# Patient Record
Sex: FEMALE | Race: BLACK OR AFRICAN AMERICAN | Employment: OTHER | URBAN - METROPOLITAN AREA
[De-identification: names, ages, dates, MRNs, and addresses within clinical notes are randomized per-mention and may not be internally consistent; named-entity substitution may affect disease eponyms.]

---

## 2017-01-31 ENCOUNTER — HOSPITAL ENCOUNTER (OUTPATIENT)
Dept: MAMMOGRAPHY | Age: 72
Discharge: HOME | End: 2017-01-31
Attending: INTERNAL MEDICINE
Payer: MEDICARE

## 2017-01-31 DIAGNOSIS — Z12.31 VISIT FOR SCREENING MAMMOGRAM: ICD-10-CM

## 2017-01-31 PROCEDURE — 77063 BREAST TOMOSYNTHESIS BI: CPT

## 2018-02-05 ENCOUNTER — HOSPITAL ENCOUNTER (OUTPATIENT)
Dept: MAMMOGRAPHY | Age: 73
Discharge: HOME | End: 2018-02-05
Attending: INTERNAL MEDICINE
Payer: MEDICARE

## 2018-02-05 DIAGNOSIS — Z12.31 ENCOUNTER FOR SCREENING MAMMOGRAM FOR BREAST CANCER: ICD-10-CM

## 2018-02-05 PROCEDURE — 77067 SCR MAMMO BI INCL CAD: CPT

## 2019-04-19 ENCOUNTER — HOSPITAL ENCOUNTER (OUTPATIENT)
Dept: MAMMOGRAPHY | Age: 74
Discharge: HOME | End: 2019-04-19
Attending: INTERNAL MEDICINE
Payer: MEDICARE

## 2019-04-19 DIAGNOSIS — Z12.31 VISIT FOR SCREENING MAMMOGRAM: ICD-10-CM

## 2019-04-19 PROCEDURE — 77063 BREAST TOMOSYNTHESIS BI: CPT

## 2021-02-12 ENCOUNTER — VIRTUAL VISIT (OUTPATIENT)
Dept: FAMILY MEDICINE CLINIC | Age: 76
End: 2021-02-12
Payer: MEDICARE

## 2021-02-12 ENCOUNTER — TELEPHONE (OUTPATIENT)
Dept: FAMILY MEDICINE CLINIC | Age: 76
End: 2021-02-12

## 2021-02-12 DIAGNOSIS — Z13.820 SCREENING FOR OSTEOPOROSIS: ICD-10-CM

## 2021-02-12 DIAGNOSIS — N17.9 AKI (ACUTE KIDNEY INJURY) (HCC): ICD-10-CM

## 2021-02-12 DIAGNOSIS — Z13.6 SCREENING FOR CARDIOVASCULAR CONDITION: ICD-10-CM

## 2021-02-12 DIAGNOSIS — Z13.0 SCREENING FOR ENDOCRINE, METABOLIC AND IMMUNITY DISORDER: ICD-10-CM

## 2021-02-12 DIAGNOSIS — G56.03 BILATERAL CARPAL TUNNEL SYNDROME: ICD-10-CM

## 2021-02-12 DIAGNOSIS — Z13.29 SCREENING FOR ENDOCRINE, METABOLIC AND IMMUNITY DISORDER: ICD-10-CM

## 2021-02-12 DIAGNOSIS — M81.8 OTHER OSTEOPOROSIS WITHOUT CURRENT PATHOLOGICAL FRACTURE: ICD-10-CM

## 2021-02-12 DIAGNOSIS — I10 ESSENTIAL HYPERTENSION: ICD-10-CM

## 2021-02-12 DIAGNOSIS — Z13.228 SCREENING FOR ENDOCRINE, METABOLIC AND IMMUNITY DISORDER: ICD-10-CM

## 2021-02-12 DIAGNOSIS — Z12.11 SCREENING FOR COLON CANCER: ICD-10-CM

## 2021-02-12 PROCEDURE — G8427 DOCREV CUR MEDS BY ELIG CLIN: HCPCS | Performed by: NURSE PRACTITIONER

## 2021-02-12 PROCEDURE — 1101F PT FALLS ASSESS-DOCD LE1/YR: CPT | Performed by: NURSE PRACTITIONER

## 2021-02-12 PROCEDURE — G8432 DEP SCR NOT DOC, RNG: HCPCS | Performed by: NURSE PRACTITIONER

## 2021-02-12 PROCEDURE — G8756 NO BP MEASURE DOC: HCPCS | Performed by: NURSE PRACTITIONER

## 2021-02-12 PROCEDURE — 1090F PRES/ABSN URINE INCON ASSESS: CPT | Performed by: NURSE PRACTITIONER

## 2021-02-12 PROCEDURE — 3017F COLORECTAL CA SCREEN DOC REV: CPT | Performed by: NURSE PRACTITIONER

## 2021-02-12 PROCEDURE — 99203 OFFICE O/P NEW LOW 30 MIN: CPT | Performed by: NURSE PRACTITIONER

## 2021-02-12 RX ORDER — ASCORBIC ACID 500 MG
500 TABLET ORAL
COMMUNITY
Start: 2021-02-01 | End: 2021-02-12 | Stop reason: ALTCHOICE

## 2021-02-12 RX ORDER — ALBUTEROL SULFATE 90 UG/1
AEROSOL, METERED RESPIRATORY (INHALATION)
COMMUNITY
Start: 2020-11-18

## 2021-02-12 RX ORDER — ATENOLOL 25 MG/1
TABLET ORAL
COMMUNITY
Start: 2020-11-18 | End: 2021-03-19 | Stop reason: SINTOL

## 2021-02-12 RX ORDER — FAMOTIDINE 20 MG/1
20 TABLET, FILM COATED ORAL DAILY
COMMUNITY
Start: 2021-02-02 | End: 2021-02-12 | Stop reason: ALTCHOICE

## 2021-02-12 RX ORDER — MELATONIN
5000 DAILY
COMMUNITY
Start: 2021-02-02 | End: 2021-02-12 | Stop reason: ALTCHOICE

## 2021-02-12 RX ORDER — HYDROCHLOROTHIAZIDE 25 MG/1
25 TABLET ORAL DAILY
COMMUNITY
End: 2021-02-12 | Stop reason: SDUPTHER

## 2021-02-12 RX ORDER — DEXAMETHASONE 6 MG/1
TABLET ORAL
COMMUNITY
Start: 2021-02-02 | End: 2021-02-12 | Stop reason: ALTCHOICE

## 2021-02-12 RX ORDER — AMMONIUM LACTATE 12 G/100G
LOTION TOPICAL
COMMUNITY
Start: 2020-11-18

## 2021-02-12 RX ORDER — LANOLIN ALCOHOL/MO/W.PET/CERES
9 CREAM (GRAM) TOPICAL
COMMUNITY
Start: 2021-02-01 | End: 2021-02-12 | Stop reason: ALTCHOICE

## 2021-02-12 RX ORDER — AMLODIPINE BESYLATE 2.5 MG/1
TABLET ORAL
COMMUNITY
Start: 2020-12-30 | End: 2021-07-06 | Stop reason: SDUPTHER

## 2021-02-12 RX ORDER — HYDROCHLOROTHIAZIDE 12.5 MG/1
TABLET ORAL
COMMUNITY
Start: 2020-12-19 | End: 2021-03-19 | Stop reason: SDUPTHER

## 2021-02-12 NOTE — PROGRESS NOTES
Coral Lakhani is a 76 y.o. female who was seen by synchronous (real-time) audio-video technology on 2/12/2021 for Establish Care and Follow Up Chronic Condition (blood pressure)        Assessment & Plan:   Diagnoses and all orders for this visit:    1. Essential hypertension  -     REFERRAL TO CARDIOLOGY  -     METABOLIC PANEL, COMPREHENSIVE; Future    2. Bilateral carpal tunnel syndrome  -     REFERRAL TO ORTHOPEDICS    3. COLUMBA (acute kidney injury) ([facility] [address]  -     REFERRAL TO NEPHROLOGY  -     METABOLIC PANEL, COMPREHENSIVE; Future    4. Screening for colon cancer  -     COLOGUARD TEST (FECAL DNA COLORECTAL CANCER SCREENING)    5. Screening for osteoporosis  -     DEXA BONE DENSITY STUDY AXIAL; Future  -     METABOLIC PANEL, COMPREHENSIVE; Future    6. Screening for endocrine, metabolic and immunity disorder  -     HEPATITIS C AB; Future  -     METABOLIC PANEL, COMPREHENSIVE; Future    7. Screening for cardiovascular condition  -     LIPID PANEL; Future    8. Other osteoporosis without current pathological fracture   -     DEXA BONE DENSITY STUDY AXIAL; Future        I spent at least 30 minutes on this visit with this new patient. 712  Subjective:   Patient is being seen today to establish care as a new patient. Patient is also presenting for a hospital follow-up. She was admitted into the hospital on January 27, 2021 and discharged on February 6, 2021. She was admitted for acute kidney injury at that time she also had pneumonia of both lower lobes and patient also was positive for Covid. During the hospital stay she also had severe sepsis. According to records patient have altered mental status. Patient is a Jehovah witness. According to notes she is a poor historian and cannot provide much detail. Patient previous PCP was Dr. Xiomara Sarah. Patient had a EKG completed, a CT of the head with no contrast, chest x-ray that showed atelectatic changes.     Hypertension  Patient is currently on Amlodpine, Atenolol, Colonidine, and HCTZ. Patient states her blood pressure varies from 130/70 to 190/90. Patient denies having chest pain, SOB, vision changes or headaches. Patient states she is concerned that she is on these medication and continue to have fluctuations. She is requested to be referred to a cardiologist for a full workup. Dermatitis   Placed on ammonium lactate 12% for dry skin. This prescribed by previous PCP    COLUMBA  Patient was diagnosed in the ER. She is unaware of this diagnosis and states she just had a UTI. Patient denies any frequency or urgency. Caparl tunnel   Patient states she have  carpal tunnel and was advised to give a splint. Patient states she have numbness and tingling in her fingers on both hands. She states her hands sometimes throb as well and her wrist hurt upon movement sometimes. She have been having these symptoms for 6 months. Patient states she takes tylenol but it mildly helps. She states she wears a brace on both wrist at night to help with relief of pain. Reviewed medications with patient today. Prior to Admission medications    Medication Sig Start Date End Date Taking? Authorizing Provider   atenoloL (TENORMIN) 25 mg tablet TAKE 1 TABLET BY MOUTH EVERY DAY 11/18/20  Yes Provider, Historical   albuterol (PROVENTIL HFA, VENTOLIN HFA, PROAIR HFA) 90 mcg/actuation inhaler 1 OR 2 PUFFS EVERY 4 HRS AS NEEDED 11/18/20  Yes Provider, Historical   amLODIPine (NORVASC) 2.5 mg tablet TAKE 1 TABLET BY MOUTH EVERY DAY 12/30/20  Yes Provider, Historical   famotidine (PEPCID) 20 mg tablet Take 20 mg by mouth daily.  2/2/21  Yes Provider, Historical   ammonium lactate (LAC-HYDRIN) 12 % lotion DIME  SIZED AMOUTN TWICE A DAY TO LEGS 90 DAYS 11/18/20  Yes Provider, Historical   hydroCHLOROthiazide (HYDRODIURIL) 12.5 mg tablet TAKE 1 TABLET BY MOUTH ONCE A DAY FOR BLOOD PRESSURE 90 12/19/20  Yes Provider, Historical   cholecalciferol (VITAMIN D3) 1,000 unit tablet Take 1 Tab by mouth daily. 8/6/15  Yes Gene Edgar MD   calcium-vitamin D (CALCIUM 500+D) 500 mg(1,250mg) -200 unit per tablet Take 1 Tab by mouth two (2) times daily (with meals). 8/6/15  Yes Gene Edgar MD   cloNIDine HCl (CATAPRES) 0.2 mg tablet Take 0.2 mg by mouth two (2) times a day. Yes Provider, Historical   hydroCHLOROthiazide (HYDRODIURIL) 25 mg tablet Take 25 mg by mouth daily. 2/12/21  Provider, Historical   HYDROcodone-acetaminophen (NORCO) 5-325 mg per tablet Take 1 Tab by mouth every four (4) hours as needed. Max Daily Amount: 6 Tabs. 8/6/15   Gene Edgar MD   triamterene-hydrochlorothiazide (MAXZIDE) 37.5-25 mg per tablet Take 1 Tab by mouth daily. 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    Diverticulitis K57.92    Hematuria R31.9    GI bleeding K92.2     Patient Active Problem List    Diagnosis Date Noted    GI bleeding 08/04/2015    Hypertension     Positive PPD     Diverticulitis     Hematuria      No Known Allergies  Past Surgical History:   Procedure Laterality Date    ENDOSCOPY, COLON, DIAGNOSTIC  01/29/2008    HX GYN      Fibroid Tumorectomy    HX KNEE ARTHROSCOPY  5/26/10    R knee     Social History     Tobacco Use    Smoking status: Never Smoker    Smokeless tobacco: Never Used   Substance Use Topics    Alcohol use: No       Review of Systems   Constitutional: Negative. Negative for chills, fever and malaise/fatigue. Eyes: Negative. Respiratory: Negative for cough, shortness of breath and wheezing. Cardiovascular: Negative for chest pain, palpitations and leg swelling. Musculoskeletal:        Wrist pain with numbness and tingling   Skin: Negative. Neurological: Negative.         Objective:     Patient-Reported Vitals 2/12/2021   Patient-Reported Weight 210   Patient-Reported Systolic  306   Patient-Reported Diastolic 70      General: alert, cooperative, no distress   Mental status: normal mood, behavior, speech, dress, motor activity, and thought processes, able to follow commands   HENT: NCAT   Neck: no visualized mass   Resp: no respiratory distress   Neuro: no gross deficits   Skin: no discoloration or lesions of concern on visible areas   Psychiatric: normal affect, consistent with stated mood, no evidence of hallucinations     Additional exam findings: We discussed the expected course, resolution and complications of the diagnosis(es) in detail. Medication risks, benefits, costs, interactions, and alternatives were discussed as indicated. I advised her to contact the office if her condition worsens, changes or fails to improve as anticipated. She expressed understanding with the diagnosis(es) and plan. Sammie Power, who was evaluated through a patient-initiated, synchronous (real-time) audio-video encounter, and/or her healthcare decision maker, is aware that it is a billable service, with coverage as determined by her insurance carrier. She provided verbal consent to proceed: Yes, and patient identification was verified. It was conducted pursuant to the emergency declaration under the [address] [place] [address], [address] [place] [address] authority and the [employer] Act. A caregiver was present when appropriate. Ability to conduct physical exam was limited. I was in the office. The patient was at home.       Belem Benavidez NP

## 2021-02-12 NOTE — TELEPHONE ENCOUNTER
Called to schedule:    Return in about 4 weeks (around 3/12/2021) for davon lab appt. then 4 week follow up for hypertension, poss diabetes    Left message to call back.

## 2021-02-18 ENCOUNTER — HOSPITAL ENCOUNTER (OUTPATIENT)
Dept: BONE DENSITY | Age: 76
Discharge: HOME | End: 2021-02-18
Payer: MEDICARE

## 2021-02-18 DIAGNOSIS — Z13.820 SCREENING FOR OSTEOPOROSIS: ICD-10-CM

## 2021-02-18 DIAGNOSIS — M81.8 OTHER OSTEOPOROSIS WITHOUT CURRENT PATHOLOGICAL FRACTURE: ICD-10-CM

## 2021-02-18 PROCEDURE — 77080 DXA BONE DENSITY AXIAL: CPT

## 2021-02-19 ENCOUNTER — OFFICE VISIT (OUTPATIENT)
Dept: ORTHOPEDIC SURGERY | Age: 76
End: 2021-02-19
Payer: MEDICARE

## 2021-02-19 VITALS
RESPIRATION RATE: 16 BRPM | SYSTOLIC BLOOD PRESSURE: 149 MMHG | OXYGEN SATURATION: 100 % | BODY MASS INDEX: 41.35 KG/M2 | HEART RATE: 101 BPM | HEIGHT: 61 IN | TEMPERATURE: 97.2 F | WEIGHT: 219 LBS | DIASTOLIC BLOOD PRESSURE: 89 MMHG

## 2021-02-19 DIAGNOSIS — G56.03 BILATERAL CARPAL TUNNEL SYNDROME: Primary | ICD-10-CM

## 2021-02-19 PROCEDURE — G8536 NO DOC ELDER MAL SCRN: HCPCS | Performed by: ORTHOPAEDIC SURGERY

## 2021-02-19 PROCEDURE — 99203 OFFICE O/P NEW LOW 30 MIN: CPT | Performed by: ORTHOPAEDIC SURGERY

## 2021-02-19 PROCEDURE — G8754 DIAS BP LESS 90: HCPCS | Performed by: ORTHOPAEDIC SURGERY

## 2021-02-19 PROCEDURE — G8432 DEP SCR NOT DOC, RNG: HCPCS | Performed by: ORTHOPAEDIC SURGERY

## 2021-02-19 PROCEDURE — 1101F PT FALLS ASSESS-DOCD LE1/YR: CPT | Performed by: ORTHOPAEDIC SURGERY

## 2021-02-19 PROCEDURE — G8399 PT W/DXA RESULTS DOCUMENT: HCPCS | Performed by: ORTHOPAEDIC SURGERY

## 2021-02-19 PROCEDURE — 1090F PRES/ABSN URINE INCON ASSESS: CPT | Performed by: ORTHOPAEDIC SURGERY

## 2021-02-19 PROCEDURE — G8417 CALC BMI ABV UP PARAM F/U: HCPCS | Performed by: ORTHOPAEDIC SURGERY

## 2021-02-19 PROCEDURE — 3017F COLORECTAL CA SCREEN DOC REV: CPT | Performed by: ORTHOPAEDIC SURGERY

## 2021-02-19 PROCEDURE — G8753 SYS BP > OR = 140: HCPCS | Performed by: ORTHOPAEDIC SURGERY

## 2021-02-19 PROCEDURE — 20526 THER INJECTION CARP TUNNEL: CPT | Performed by: ORTHOPAEDIC SURGERY

## 2021-02-19 PROCEDURE — G8427 DOCREV CUR MEDS BY ELIG CLIN: HCPCS | Performed by: ORTHOPAEDIC SURGERY

## 2021-02-19 NOTE — PROGRESS NOTES
Lucie Thomas is a 76 y.o. female right handed retiree. Worker's Compensation and legal considerations: none filed. Vitals:    02/19/21 1358   BP: (!) 149/89   Pulse: (!) 101   Resp: 16   Temp: 97.2 °F (36.2 °C)   SpO2: 100%   Weight: 219 lb (99.3 kg)   Height: 5' 0.5\" (1.537 m)   PainSc:   8           Chief Complaint   Patient presents with    Wrist Pain     bilateral         HPI: Patient presents today with complaints of bilateral hand numbness and tingling.     Date of onset: January 2021    Injury: No    Prior Treatment:  No    Numbness/ Tingling: Yes: Comment: Bilateral      ROS: Review of Systems - General ROS: negative  Psychological ROS: negative  ENT ROS: negative  Allergy and Immunology ROS: negative  Hematological and Lymphatic ROS: negative  Respiratory ROS: no cough, shortness of breath, or wheezing  Cardiovascular ROS: no chest pain or dyspnea on exertion  Gastrointestinal ROS: no abdominal pain, change in bowel habits, or black or bloody stools  Musculoskeletal ROS: positive for - pain in hand - bilateral  Neurological ROS: positive for - numbness/tingling  Dermatological ROS: negative    Past Medical History:   Diagnosis Date    COVID-19     Diverticulitis     Hematuria     Hypertension     Positive PPD     Sinusitis     Vertigo        Past Surgical History:   Procedure Laterality Date    ENDOSCOPY, COLON, DIAGNOSTIC  01/29/2008    HX GYN      Fibroid Tumorectomy    HX KNEE ARTHROSCOPY  5/26/10    R knee       Current Outpatient Medications   Medication Sig Dispense Refill    albuterol (PROVENTIL HFA, VENTOLIN HFA, PROAIR HFA) 90 mcg/actuation inhaler 1 OR 2 PUFFS EVERY 4 HRS AS NEEDED      amLODIPine (NORVASC) 2.5 mg tablet TAKE 1 TABLET BY MOUTH EVERY DAY      ammonium lactate (LAC-HYDRIN) 12 % lotion DIME  SIZED AMOUTN TWICE A DAY TO LEGS 90 DAYS      hydroCHLOROthiazide (HYDRODIURIL) 12.5 mg tablet TAKE 1 TABLET BY MOUTH ONCE A DAY FOR BLOOD PRESSURE 90      cloNIDine HCl (CATAPRES) 0.2 mg tablet Take 0.2 mg by mouth two (2) times a day.  atenoloL (TENORMIN) 25 mg tablet TAKE 1 TABLET BY MOUTH EVERY DAY         No Known Allergies        PE:     Physical Exam  Vitals signs and nursing note reviewed. Constitutional:       General: She is not in acute distress. Appearance: Normal appearance. She is not ill-appearing. Eyes:      Extraocular Movements: Extraocular movements intact. Pupils: Pupils are equal, round, and reactive to light. Neck:      Musculoskeletal: Normal range of motion and neck supple. Cardiovascular:      Pulses: Normal pulses. Pulmonary:      Effort: Pulmonary effort is normal. No respiratory distress. Abdominal:      General: Abdomen is flat. There is no distension. Musculoskeletal: Normal range of motion. General: No swelling, tenderness, deformity or signs of injury. Right lower leg: No edema. Left lower leg: No edema. Skin:     General: Skin is warm and dry. Capillary Refill: Capillary refill takes less than 2 seconds. Findings: No bruising or erythema. Neurological:      General: No focal deficit present. Mental Status: She is alert and oriented to person, place, and time. Cranial Nerves: No cranial nerve deficit. Sensory: No sensory deficit. Psychiatric:         Mood and Affect: Mood normal.         Behavior: Behavior normal.            NEUROVASCULAR    Examination L R Examination L R   Carpal Comp. + + Pronator Comp. - -   Carpal Tinel + + Pronator Tinel - -   Phalen's + + Pronator Stress - -   Cubital Comp. - - Guyon Comp. - -   Cubital Tinel - - Guyon Tinel - -   Elbow Hyperflexion - - Adson's - -   Spurling's - - SC Comp. - -   PCB Median abn - - SC Tinel - -   Radial Tinel - - IC Comp.  - -   Digital Tinel - - IC Tinel - -   Radial 2-Pt WNL WNL Ulnar 2-Pt WNL WNL     Radial Pulse: 2+  Capillary Refill: < 2 sec  Clifford: Not Performed  Digital Clifford: Not Performed        Imaging: None indicated        ICD-10-CM ICD-9-CM    1. Bilateral carpal tunnel syndrome  G56.03 354.0 AMB SUPPLY ORDER      triamcinolone acetonide (KENALOG) 10 mg/mL injection 10 mg      INJECT CARPAL TUNNEL      EMG TWO EXTREMITIES UPPER      NCV/LAT MOTOR PER NERVE UP/RT      NCV/LAT MOTOR PER NERVE UP/LT         Plan:     Bilateral carpal tunnel injections, carpal tunnel braces, and upper extremity EMGs. Follow-up and Dispositions    · Return for EMG review. Plan was reviewed with patient, who verbalized agreement and understanding of the plan    [address] [place] [address] NOTE        Chart reviewed for the following:   Chava RAMIREZ DO, have reviewed the History, Physical and updated the Allergic reactions for [address] [place] [address] Rd performed immediately prior to start of procedure:   Chava RAMIREZ DO, have performed the following reviews on [facility] prior to the start of the procedure:            * Patient was identified by name and date of birth   * Agreement on procedure being performed was verified  * Risks and Benefits explained to the patient  * Procedure site verified and marked as necessary  * Patient was positioned for comfort  * Consent was signed and verified     Time: 14:15      Date of procedure: 2/19/2021    Procedure performed by: Gurdeep Mullen DO    Provider assisted by: Juan Mcduffie LPN    Patient assisted by: self    How tolerated by patient: tolerated the procedure well with no complications    Post Procedural Pain Scale: 0 - No Hurt    Comments: none    Procedure:  After consent was obtained, using sterile technique the carpal tunnel was prepped. Local anesthetic used: 1% lidocaine. Kenalog 5 mg X2 and was then injected and the needle withdrawn. The procedure was well tolerated. The patient is asked to continue to rest the area for a few more days before resuming regular activities.   It may be more painful for the first 1-2 days. Watch for fever, or increased swelling or persistent pain in the joint. Call or return to clinic prn if such symptoms occur or there is failure to improve as anticipated.

## 2021-02-26 DIAGNOSIS — G56.03 BILATERAL CARPAL TUNNEL SYNDROME: ICD-10-CM

## 2021-03-01 ENCOUNTER — OFFICE VISIT (OUTPATIENT)
Dept: ORTHOPEDIC SURGERY | Age: 76
End: 2021-03-01
Payer: MEDICARE

## 2021-03-01 VITALS
WEIGHT: 216 LBS | DIASTOLIC BLOOD PRESSURE: 83 MMHG | HEART RATE: 85 BPM | TEMPERATURE: 97.4 F | HEIGHT: 63 IN | BODY MASS INDEX: 38.27 KG/M2 | SYSTOLIC BLOOD PRESSURE: 145 MMHG | RESPIRATION RATE: 20 BRPM

## 2021-03-01 DIAGNOSIS — M54.12 CERVICAL RADICULOPATHY: ICD-10-CM

## 2021-03-01 DIAGNOSIS — R94.131 ABNORMAL EMG: ICD-10-CM

## 2021-03-01 DIAGNOSIS — R20.0 NUMBNESS AND TINGLING IN BOTH HANDS: Primary | ICD-10-CM

## 2021-03-01 DIAGNOSIS — R20.2 NUMBNESS AND TINGLING IN BOTH HANDS: Primary | ICD-10-CM

## 2021-03-01 DIAGNOSIS — R20.0 NUMBNESS AND TINGLING IN BOTH HANDS: ICD-10-CM

## 2021-03-01 DIAGNOSIS — R20.2 NUMBNESS AND TINGLING IN BOTH HANDS: ICD-10-CM

## 2021-03-01 DIAGNOSIS — G54.9 MYELORADICULOPATHY: ICD-10-CM

## 2021-03-01 PROCEDURE — G8432 DEP SCR NOT DOC, RNG: HCPCS | Performed by: PHYSICAL MEDICINE & REHABILITATION

## 2021-03-01 PROCEDURE — 1090F PRES/ABSN URINE INCON ASSESS: CPT | Performed by: PHYSICAL MEDICINE & REHABILITATION

## 2021-03-01 PROCEDURE — G8536 NO DOC ELDER MAL SCRN: HCPCS | Performed by: PHYSICAL MEDICINE & REHABILITATION

## 2021-03-01 PROCEDURE — G8399 PT W/DXA RESULTS DOCUMENT: HCPCS | Performed by: PHYSICAL MEDICINE & REHABILITATION

## 2021-03-01 PROCEDURE — 99204 OFFICE O/P NEW MOD 45 MIN: CPT | Performed by: PHYSICAL MEDICINE & REHABILITATION

## 2021-03-01 PROCEDURE — 95912 NRV CNDJ TEST 11-12 STUDIES: CPT | Performed by: PHYSICAL MEDICINE & REHABILITATION

## 2021-03-01 PROCEDURE — 3017F COLORECTAL CA SCREEN DOC REV: CPT | Performed by: PHYSICAL MEDICINE & REHABILITATION

## 2021-03-01 PROCEDURE — 95886 MUSC TEST DONE W/N TEST COMP: CPT | Performed by: PHYSICAL MEDICINE & REHABILITATION

## 2021-03-01 PROCEDURE — G8754 DIAS BP LESS 90: HCPCS | Performed by: PHYSICAL MEDICINE & REHABILITATION

## 2021-03-01 PROCEDURE — G8427 DOCREV CUR MEDS BY ELIG CLIN: HCPCS | Performed by: PHYSICAL MEDICINE & REHABILITATION

## 2021-03-01 PROCEDURE — G8753 SYS BP > OR = 140: HCPCS | Performed by: PHYSICAL MEDICINE & REHABILITATION

## 2021-03-01 PROCEDURE — G8417 CALC BMI ABV UP PARAM F/U: HCPCS | Performed by: PHYSICAL MEDICINE & REHABILITATION

## 2021-03-01 PROCEDURE — 1101F PT FALLS ASSESS-DOCD LE1/YR: CPT | Performed by: PHYSICAL MEDICINE & REHABILITATION

## 2021-03-01 RX ORDER — PREDNISONE 10 MG/1
TABLET ORAL
Qty: 21 TAB | Refills: 0 | Status: SHIPPED | OUTPATIENT
Start: 2021-03-01 | End: 2021-03-30

## 2021-03-01 NOTE — PROGRESS NOTES
[address]  [address], [address]  [address]  Phone: [phone]  Fax: [phone]        Juan Antonio Hendricks  : 1945  PCP: Trace De Los Santos NP  3/1/2021    NEW PATIENT AND   ELECTROMYOGRAPHY AND NERVE CONDUCTION STUDIES     Sissy Libman was referred by Dr. Eleonora Scott for electrodiagnostic evaluation of bilateral hand numbness and tingling. NCV & EMG Findings:  Evaluation of the right median/radial (dig I) comparison nerve showed prolonged distal peak latency (Median, 3.0 ms) and abnormal peak latency difference (Median-Radial, 0.6 ms). The right median/ulnar (dig IV) comparison nerve showed prolonged distal peak latency (Median Wr, 3.8 ms) and abnormal peak latency difference (Median Wr-Ulnar Wr, 0.6 ms). All remaining nerves (as indicated in the following tables) were within normal limits. Left vs. Right side comparison data for the median sensory nerve indicates abnormal L-R latency difference (0.5 ms). All remaining left vs. right side differences were within normal limits. Needle evaluation of the left biceps muscle showed increased motor unit amplitude. The left deltoid muscle showed increased insertional activity, slightly increased spontaneous activity, and slightly increased polyphasic potentials. All remaining muscles (as indicated in the following table) showed no evidence of electrical instability. INTERPRETATION  This is an abnormal electrodiagnostic examination. These findings may be consistent with:   1. Mild median mononeuropathy at the right wrist (carpal tunnel syndrome)   2. Chronic left C5 radiculopathy - this is based on signs of muscle membrane instability and chronicity in the left deltoid and biceps. CLINICAL INTERPRETATION  Her electrodiagnostic finding of right carpal tunnel syndrome is consistent with some of her right hand symptoms.  However, her left arm, which is the worse side appears to be showing some signs of radiculopathy. This may be consistent with her exam finding of bilateral positive green's. PLAN  1. Cervical MRI - numbness and tingling in both hands; abnormal EMG; positive green's sign bilaterally; falls; evaluate myeloradiculopathy. 2. Prednisone dosepack    Pt will f/u after MRI or sooner as needed. HISTORY OF PRESENT ILLNESS  Eitan Vyas is a 76 y.o. female. Pt presents today for BUE EMG evaluation of bilateral hand numbness and tingling. She is LHD. Pt notes that she was previously told by another physician in the past that she had a pinched nerve in her neck, but she never had imaging or treatment. Pt notes that she has had falls recently. PAST MEDICAL HISTORY   Past Medical History:   Diagnosis Date    COVID-19     Diverticulitis     Hematuria     Hypertension     Positive PPD     Sinusitis     Vertigo        Past Surgical History:   Procedure Laterality Date    ENDOSCOPY, COLON, DIAGNOSTIC  01/29/2008    HX GYN      Fibroid Tumorectomy    HX KNEE ARTHROSCOPY  5/26/10    R knee   . MEDICATIONS    Current Outpatient Medications   Medication Sig Dispense Refill    atenoloL (TENORMIN) 25 mg tablet TAKE 1 TABLET BY MOUTH EVERY DAY      albuterol (PROVENTIL HFA, VENTOLIN HFA, PROAIR HFA) 90 mcg/actuation inhaler 1 OR 2 PUFFS EVERY 4 HRS AS NEEDED      amLODIPine (NORVASC) 2.5 mg tablet TAKE 1 TABLET BY MOUTH EVERY DAY      ammonium lactate (LAC-HYDRIN) 12 % lotion DIME  SIZED AMOUTN TWICE A DAY TO LEGS 90 DAYS      hydroCHLOROthiazide (HYDRODIURIL) 12.5 mg tablet TAKE 1 TABLET BY MOUTH ONCE A DAY FOR BLOOD PRESSURE 90      cloNIDine HCl (CATAPRES) 0.2 mg tablet Take 0.2 mg by mouth two (2) times a day.           ALLERGIES  No Known Allergies       SOCIAL HISTORY    Social History     Socioeconomic History    Marital status: SINGLE     Spouse name: Not on file    Number of children: Not on file    Years of education: Not on file    Highest education level: Not on file   Tobacco Use    Smoking status: Never Smoker    Smokeless tobacco: Never Used   Substance and Sexual Activity    Alcohol use: No    Drug use: Never       FAMILY HISTORY  Family History   Problem Relation Age of Onset    Cancer Mother     Hypertension Father     Stroke Father     Cancer Sister          PHYSICAL EXAMINATION  Visit Vitals  BP (!) 145/83   Pulse 85   Temp 97.4 °F (36.3 °C) (Temporal)   Resp 20   Ht 5' 3\" (1.6 m)   Wt 216 lb (98 kg)   BMI 38.26 kg/m²       Pain Assessment  3/1/2021   Location of Pain Hand;Wrist   Location Modifiers Left;Right   Severity of Pain 0   Quality of Pain -   Quality of Pain Comment -   Duration of Pain Persistent   Frequency of Pain (No Data)   Frequency of Pain Comment n/t   Limiting Behavior -   Result of Injury -           Constitutional:  Well developed, well nourished, in no acute distress. Psychiatric: Affect and mood are appropriate. Integumentary: No rashes or abrasions noted on exposed areas. SPINE/MUSCULOSKELETAL EXAM    On brief examination:     Cervical spine:  Neck is midline. Normal muscle tone. No focal atrophy is noted. ROM pain free. Shoulder ROM intact. No tenderness to palpation. Negative Spurling's sign. Negative Tinel's sign. Positive Waters's sign bilaterally. Negative Vaz sign on the L. Sensation in the bilateral arms grossly intact to light touch. Strength intact. No clonus at feet. DTRs are +2.     NCV & EMG Findings:  Nerve Conduction Studies  Anti Sensory Summary Table     Stim Site NR Peak (ms) Norm Peak (ms) O-P Amp (µV) Norm O-P Amp Site1 Site2 Delta-P (ms) Dist (cm) Alexander (m/s) Norm Alexander (m/s)   Left Median Anti Sensory (2nd Digit)   Wrist    3.1 <3.6 24.0 >10 Wrist 2nd Digit 3.1 14.0 45 >39   Right Median Anti Sensory (2nd Digit)   Wrist    3.6 <3.6 19.9 >10 Wrist 2nd Digit 3.6 14.0 39 >39   Left Radial Anti Sensory (Base 1st Digit)   Wrist    1.9 <3.1 38.4 Wrist Base 1st Digit 1.9 0.0     Right Radial Anti Sensory (Base 1st Digit)   Wrist    2.1 <3.1 25.5  Wrist Base 1st Digit 2.1 0.0     Left Ulnar Anti Sensory (5th Digit)   Wrist    3.2 <3.7 24.5 >15.0 Wrist 5th Digit 3.2 14.0 44 >38   Right Ulnar Anti Sensory (5th Digit)   Wrist    3.3 <3.7 32.0 >15.0 Wrist 5th Digit 3.3 14.0 42 >38     Motor Summary Table     Stim Site NR Onset (ms) Norm Onset (ms) O-P Amp (mV) Norm O-P Amp Site1 Site2 Delta-0 (ms) Dist (cm) Alexander (m/s) Norm Alexander (m/s)   Left Median Motor (Abd Poll Brev)   Wrist    3.4 <4.2 7.2 >5 Elbow Wrist 3.4 20.5 60 >50   Elbow    6.8  6.7          Right Median Motor (Abd Poll Brev)   Wrist    3.6 <4.2 8.7 >5 Elbow Wrist 4.1 24.0 59 >50   Elbow    7.7  8.1          Left Ulnar Motor (Abd Dig Min)   Wrist    2.6 <4.2 10.1 >3 B Elbow Wrist 3.3 19.0 58 >53   B Elbow    5.9  9.1  A Elbow B Elbow 1.9 10.0 53 >53   A Elbow    7.8  8.8          Right Ulnar Motor (Abd Dig Min)   Wrist    3.0 <4.2 10.9 >3 B Elbow Wrist 3.1 18.0 58 >53   B Elbow    6.1  9.1  A Elbow B Elbow 1.9 10.0 53 >53   A Elbow    8.0  8.0            Comparison Summary Table     Stim Site NR Peak (ms) Norm Peak (ms) O-P Amp (µV) Site1 Site2 Delta-P (ms) Norm Delta (ms)   Right Median/Radial Dig I Comparison (Digit 1 - 10cm)   Median    3.0 <2.9 21.9 Median Radial 0.6 <0.4   Radial    2.4 <2.8 9.8       Right Median/Ulnar Dig IV Comparison (Digit 4 - 14cm)   Median Wr    3.8 <3.3 82.2 Median Wr Ulnar Wr 0.6 <0.4   Ulnar Wr    3.2 <3.3 5.2         EMG     Side Muscle Nerve Root Ins Act Fibs Psw Amp Dur Poly Recrt Int Lorette Rout Comment   Right Biceps Musculocut C5-6 Nml Nml Nml Nml Nml 0 Nml Nml    Right Triceps Radial C6-7-8 Nml Nml Nml Nml Nml 0 Nml Nml    Right PronatorTeres Median C6-7 Nml Nml Nml Nml Nml 0 Nml Nml    Right Abd Poll Brev Median C8-T1 Nml Nml Nml Nml Nml 0 Nml Nml    Right 1stDorInt Ulnar C8-T1 Nml Nml Nml Nml Nml 0 Nml Nml    Left Biceps Musculocut C5-6 Nml Nml Nml Incr Nml 0 Nml Nml   Left Triceps Radial C6-7-8 Nml Nml Nml Nml Nml 0 Nml Nml    Left PronatorTeres Median C6-7 Nml Nml Nml Nml Nml 0 Nml Nml    Left Abd Poll Brev Median C8-T1 Nml Nml Nml Nml Nml 0 Nml Nml    Left 1stDorInt Ulnar C8-T1 Nml Nml Nml Nml Nml 0 Nml Nml    Left Deltoid Axillary C5-6 Incr 1+ 1+ Nml Nml 1+ Nml Nml    Left Cervical Parasp Up Rami C1-3 Nml Nml Nml         Left Cervical Parasp Mid Rami C4-6 Nml Nml Nml         Left Cervical Parasp Low Rami C7-8 Nml Nml Nml             Nerve Conduction Studies  Anti Sensory Left/Right Comparison     Stim Site L Lat (ms) R Lat (ms) L-R Lat (ms) L Amp (µV) R Amp (µV) L-R Amp (%) Site1 Site2 L Alexander (m/s) R Alexander (m/s) L-R Alexander (m/s)   Median Anti Sensory (2nd Digit)   Wrist 3.1 3.6 0.5 24.0 19.9 17.1 Wrist 2nd Digit 45 39 6   Radial Anti Sensory (Base 1st Digit)   Wrist 1.9 2.1 0.2 38.4 25.5 33.6 Wrist Base 1st Digit      Ulnar Anti Sensory (5th Digit)   Wrist 3.2 3.3 0.1 24.5 32.0 23.4 Wrist 5th Digit 44 42 2     Motor Left/Right Comparison     Stim Site L Lat (ms) R Lat (ms) L-R Lat (ms) L Amp (mV) R Amp (mV) L-R Amp (%) Site1 Site2 L Alexander (m/s) R Alexander (m/s) L-R Alexander (m/s)   Median Motor (Abd Poll Brev)   Wrist 3.4 3.6 0.2 7.2 8.7 17.2 Elbow Wrist 60 59 1   Elbow 6.8 7.7 0.9 6.7 8.1 17.3        Ulnar Motor (Abd Dig Min)   Wrist 2.6 3.0 0.4 10.1 10.9 7.3 B Elbow Wrist 58 58 0   B Elbow 5.9 6.1 0.2 9.1 9.1 0.0 A Elbow B Elbow 53 53 0   A Elbow 7.8 8.0 0.2 8.8 8.0 9.1          Comparison Left/Right Comparison     Stim Site L Lat (ms) R Lat (ms) L-R Lat (ms) L Amp (µV) R Amp (µV) L-R Amp (%)   Median/Radial Dig I Comparison (Digit 1 - 10cm)   Median  3.0   21.9    Radial  2.4   9.8    Median/Ulnar Dig IV Comparison (Digit 4 - 14cm)   Median Wr  3.8   82.2    Ulnar Wr  3.2   5.2          Waveforms:                                    [facility]  OFFICE PROCEDURE PROGRESS NOTE        Chart reviewed for the following:   I, Ruddy Yeh, have reviewed the  History, Physical and updated the Allergic reactions for Yoselin Maw     TIME OUT performed immediately prior to start of procedure:   I, Rasheed Landon, have performed the following reviews on Yoselin Maw prior to the start of the procedure:            * Patient was identified by name and date of birth   * Agreement on procedure being performed was verified  * Risks and Benefits explained to the patient  * Procedure site verified and marked as necessary  * Patient was positioned for comfort  * Consent was signed and verified     Time: 10:58 AM    Date of procedure: 3/1/2021    Procedure performed by:  Myra Sheppard MD    Provider accompanied by: Shaye. Patient accompanied by: Self.     How tolerated by patient: tolerated the procedure well with no complications    Post Procedural Pain Scale: 2 - Hurts Little Bit    Comments: none    Written by Tarah Buckner, [address] as dictated by Rasheed Landon MD

## 2021-03-05 ENCOUNTER — TELEPHONE (OUTPATIENT)
Dept: CARDIOLOGY CLINIC | Age: 76
End: 2021-03-05

## 2021-03-05 NOTE — TELEPHONE ENCOUNTER
Per Dr Sydnie Davalos, Shakila Rousseau NP to be seen for HTN. Called and was unable to reach patient for her appointment I have mailed patient the appointment . She is scheduled to see Dr Johnny Geronimo on 3/19/21 @ 11:15 a.m.

## 2021-03-08 DIAGNOSIS — R20.2 NUMBNESS AND TINGLING IN BOTH HANDS: ICD-10-CM

## 2021-03-08 DIAGNOSIS — R94.131 ABNORMAL EMG: ICD-10-CM

## 2021-03-08 DIAGNOSIS — R20.0 NUMBNESS AND TINGLING IN BOTH HANDS: ICD-10-CM

## 2021-03-08 DIAGNOSIS — G54.9 MYELORADICULOPATHY: ICD-10-CM

## 2021-03-08 DIAGNOSIS — M54.12 CERVICAL RADICULOPATHY: ICD-10-CM

## 2021-03-17 DIAGNOSIS — M85.80 OSTEOPENIA, UNSPECIFIED LOCATION: Primary | ICD-10-CM

## 2021-03-17 RX ORDER — CALCIUM CARBONATE/VITAMIN D3 500 MG-10
2 TABLET ORAL
Qty: 60 TAB | Refills: 11 | Status: SHIPPED | OUTPATIENT
Start: 2021-03-17 | End: 2021-03-19 | Stop reason: ALTCHOICE

## 2021-03-17 NOTE — PROGRESS NOTES
Please notify patient of her DEXA scan results showing osteopenia. Inform patient this is the midpoint between having healthy bones and having osteoporosis. Osteopenia is when your bones are weaker than normal but not so far gone that they break easily. I have prescribed patient a calcium and vitamin D supplement to start taking. It has been sent to her pharmacy.

## 2021-03-19 ENCOUNTER — OFFICE VISIT (OUTPATIENT)
Dept: CARDIOLOGY CLINIC | Age: 76
End: 2021-03-19
Payer: MEDICARE

## 2021-03-19 VITALS
HEIGHT: 63 IN | TEMPERATURE: 97.6 F | BODY MASS INDEX: 38.8 KG/M2 | DIASTOLIC BLOOD PRESSURE: 87 MMHG | HEART RATE: 72 BPM | WEIGHT: 219 LBS | SYSTOLIC BLOOD PRESSURE: 157 MMHG

## 2021-03-19 DIAGNOSIS — I50.9 ACUTE CONGESTIVE HEART FAILURE, UNSPECIFIED HEART FAILURE TYPE (HCC): Primary | ICD-10-CM

## 2021-03-19 DIAGNOSIS — R00.2 PALPITATIONS: ICD-10-CM

## 2021-03-19 DIAGNOSIS — E66.01 SEVERE OBESITY (BMI 35.0-39.9) WITH COMORBIDITY (HCC): ICD-10-CM

## 2021-03-19 DIAGNOSIS — R55 SYNCOPE, UNSPECIFIED SYNCOPE TYPE: ICD-10-CM

## 2021-03-19 DIAGNOSIS — I10 ESSENTIAL HYPERTENSION: ICD-10-CM

## 2021-03-19 PROCEDURE — G8427 DOCREV CUR MEDS BY ELIG CLIN: HCPCS | Performed by: INTERNAL MEDICINE

## 2021-03-19 PROCEDURE — G8417 CALC BMI ABV UP PARAM F/U: HCPCS | Performed by: INTERNAL MEDICINE

## 2021-03-19 PROCEDURE — G8754 DIAS BP LESS 90: HCPCS | Performed by: INTERNAL MEDICINE

## 2021-03-19 PROCEDURE — 1090F PRES/ABSN URINE INCON ASSESS: CPT | Performed by: INTERNAL MEDICINE

## 2021-03-19 PROCEDURE — G8536 NO DOC ELDER MAL SCRN: HCPCS | Performed by: INTERNAL MEDICINE

## 2021-03-19 PROCEDURE — 99205 OFFICE O/P NEW HI 60 MIN: CPT | Performed by: INTERNAL MEDICINE

## 2021-03-19 PROCEDURE — G8432 DEP SCR NOT DOC, RNG: HCPCS | Performed by: INTERNAL MEDICINE

## 2021-03-19 PROCEDURE — 3017F COLORECTAL CA SCREEN DOC REV: CPT | Performed by: INTERNAL MEDICINE

## 2021-03-19 PROCEDURE — G8399 PT W/DXA RESULTS DOCUMENT: HCPCS | Performed by: INTERNAL MEDICINE

## 2021-03-19 PROCEDURE — G8753 SYS BP > OR = 140: HCPCS | Performed by: INTERNAL MEDICINE

## 2021-03-19 PROCEDURE — 1101F PT FALLS ASSESS-DOCD LE1/YR: CPT | Performed by: INTERNAL MEDICINE

## 2021-03-19 RX ORDER — HYDROCHLOROTHIAZIDE 25 MG/1
25 TABLET ORAL DAILY
Qty: 30 TAB | Refills: 1 | Status: SHIPPED | OUTPATIENT
Start: 2021-03-19 | End: 2021-04-08 | Stop reason: SDDI

## 2021-03-19 RX ORDER — ASPIRIN 81 MG/1
81 TABLET ORAL DAILY
Qty: 100 TAB | Status: SHIPPED | OUTPATIENT
Start: 2021-03-19 | End: 2021-05-10 | Stop reason: SINTOL

## 2021-03-19 NOTE — PATIENT INSTRUCTIONS
Medications Discontinued During This Encounter Medication Reason  Calcium-Cholecalciferol, D3, (Calcium 500 + D) 500 mg(1,250mg) -400 unit tab Therapy Completed  atenoloL (TENORMIN) 25 mg tablet Side Effects  hydroCHLOROthiazide (HYDRODIURIL) 12.5 mg tablet REORDER After the recommended changes have been made in blood pressure medicines, patient advised to keep BP/HR(pulse rate) chart twice daily and bring us results in next 4 to 5 days. Patient may send the results via \"My Chart\" if desired. Please rest for 5-10 minutes before checking blood pressure. Sit on a comfortable chair without crossing the legs and put your arm on a table. We recommend that you use an upper arm cuff. Check the blood pressure 3 times each time you check the blood pressure and record the lowest reading. If you check the blood pressure in both arms, use the higher reading. Learning About the [address] [facility] Diet What is the Mediterranean diet? The Mediterranean diet is a style of eating rather than a diet plan. It features foods eaten in [place], [place], [place] and [facility], and other countries along the [facility]. It emphasizes eating foods like fish, fruits, vegetables, beans, high-fiber breads and whole grains, nuts, and olive oil. This style of eating includes limited red meat, cheese, and sweets. Why choose the Mediterranean diet? A Mediterranean-style diet may improve heart health. It contains more fat than other heart-healthy diets. But the fats are mainly from nuts, unsaturated oils (such as fish oils and olive oil), and certain nut or seed oils (such as canola, soybean, or flaxseed oil). These fats may help protect the heart and blood vessels. How can you get started on the Mediterranean diet? Here are some things you can do to switch to a more Mediterranean way of eating. What to eat · Eat a variety of fruits and vegetables each day, such as grapes, blueberries, tomatoes, broccoli, peppers, figs, olives, spinach, eggplant, beans, lentils, and chickpeas. · Eat a variety of whole-grain foods each day, such as oats, brown rice, and whole wheat bread, pasta, and couscous. · Eat fish at least 2 times a week. Try tuna, salmon, mackerel, lake trout, herring, or sardines. · Eat moderate amounts of low-fat dairy products, such as milk, cheese, or yogurt. · Eat moderate amounts of poultry and eggs. · Choose healthy (unsaturated) fats, such as nuts, olive oil, and certain nut or seed oils like canola, soybean, and flaxseed. · Limit unhealthy (saturated) fats, such as butter, palm oil, and coconut oil. And limit fats found in animal products, such as meat and dairy products made with whole milk. Try to eat red meat only a few times a month in very small amounts. · Limit sweets and desserts to only a few times a week. This includes sugar-sweetened drinks like soda. The Mediterranean diet may also include red wine with your meal1 glass each day for women and up to 2 glasses a day for men. Tips for eating at home · Use herbs, spices, garlic, lemon zest, and citrus juice instead of salt to add flavor to foods. · Add avocado slices to your sandwich instead of villaseñor. · Have fish for lunch or dinner instead of red meat. Brush the fish with olive oil, and broil or grill it. · Sprinkle your salad with seeds or nuts instead of cheese. · Cook with olive or canola oil instead of butter or oils that are high in saturated fat. · Switch from 2% milk or whole milk to 1% or fat-free milk. · Dip raw vegetables in a vinaigrette dressing or hummus instead of dips made from mayonnaise or sour cream. 
· Have a piece of fruit for dessert instead of a piece of cake. Try baked apples, or have some dried fruit. Tips for eating out · Try broiled, grilled, baked, or poached fish instead of having it fried or breaded. · Ask your  to have your meals prepared with olive oil instead of butter.  
· Order dishes made with marinara sauce or sauces made from olive oil. Avoid sauces made from cream or mayonnaise. · Choose whole-grain breads, whole wheat pasta and pizza crust, brown rice, beans, and lentils. · Cut back on butter or margarine on bread. Instead, you can dip your bread in a small amount of olive oil. · Ask for a side salad or grilled vegetables instead of french fries or chips. Where can you learn more? Go to http://www.[employer]/ Enter 846-936-0061 in the search box to learn more about \"Learning About the Mediterranean Diet. \" Current as of: August 22, 2019               Content Version: 12.6 © 1617-9832 [employer]. Care instructions adapted under license by [employer] (which disclaims liability or warranty for this information). If you have questions about a medical condition or this instruction, always ask your healthcare professional. [place] [address] any warranty or liability for your use of this information.

## 2021-03-19 NOTE — PROGRESS NOTES
HISTORY OF PRESENT ILLNESS  Rosa Elena Bowen is a 76 y.o. female. 3/19/2021 seen for hypertension but also has other symptoms as noted below. Episode of syncope a couple of weeks ago in the tub when she fell while taking a shower. She thinks she lost her consciousness completely. Denies any aura or dizziness prior to that. Did not have any palpitations prior to that. Upon waking up, she felt completely normal immediately. New Patient  The history is provided by the medical records and patient. Associated symptoms include shortness of breath. Pertinent negatives include no chest pain and no headaches. Hypertension  Associated symptoms include shortness of breath. Pertinent negatives include no chest pain and no headaches. Shortness of Breath  The history is provided by the patient. This is a new problem. The problem occurs intermittently. The current episode started more than 1 week ago. Associated symptoms include leg swelling. Pertinent negatives include no fever, no headaches, no cough, no wheezing, no PND, no orthopnea, no chest pain, no vomiting, no rash and no claudication. The problem's precipitants include exercise (house chores). Leg Swelling  The history is provided by the patient. This is a new problem. The current episode started more than 1 week ago (yrs). The problem occurs daily. Associated symptoms include shortness of breath. Pertinent negatives include no chest pain and no headaches. The symptoms are aggravated by standing. Palpitations   The history is provided by the patient. This is a new problem. The current episode started more than 1 week ago (2/20). The problem occurs daily. The problem is associated with nothing. Associated symptoms include lower extremity edema and shortness of breath. Pertinent negatives include no fever, no malaise/fatigue, no chest pain, no claudication, no orthopnea, no PND, no nausea, no vomiting, no headaches, no dizziness and no cough.  Her past medical history is significant for hypertension. No Known Allergies    Past Medical History:   Diagnosis Date    Asthma     COVID-19     Diverticulitis     Hematuria     Hypertension     Positive PPD     Sinusitis     Syncope     Vertigo        Family History   Problem Relation Age of Onset    Cancer Mother     Hypertension Father     Stroke Father     Cancer Sister        Social History     Tobacco Use    Smoking status: Never Smoker    Smokeless tobacco: Never Used   Substance Use Topics    Alcohol use: No    Drug use: Never        Current Outpatient Medications   Medication Sig    hydroCHLOROthiazide (HYDRODIURIL) 25 mg tablet Take 1 Tab by mouth daily.  aspirin delayed-release 81 mg tablet Take 1 Tab by mouth daily.  predniSONE (STERAPRED DS) 10 mg dose pack See administration instruction per 10mg dose pack    albuterol (PROVENTIL HFA, VENTOLIN HFA, PROAIR HFA) 90 mcg/actuation inhaler 1 OR 2 PUFFS EVERY 4 HRS AS NEEDED    amLODIPine (NORVASC) 2.5 mg tablet TAKE 1 TABLET BY MOUTH EVERY DAY    ammonium lactate (LAC-HYDRIN) 12 % lotion DIME  SIZED AMOUTN TWICE A DAY TO LEGS 90 DAYS    cloNIDine HCl (CATAPRES) 0.2 mg tablet Take 0.2 mg by mouth two (2) times a day. No current facility-administered medications for this visit. Past Surgical History:   Procedure Laterality Date    ENDOSCOPY, COLON, DIAGNOSTIC  01/29/2008    HX GYN      Fibroid Tumorectomy    HX KNEE ARTHROSCOPY  5/26/10    R knee       Visit Vitals  BP (!) 157/87   Pulse 72   Temp 97.6 °F (36.4 °C) (Temporal)   Ht 5' 3\" (1.6 m)   Wt 99.3 kg (219 lb)   BMI 38.79 kg/m²       Diagnostic Studies:  I have reviewed the relevant tests done on the patient and show as follows  EKG tracings reviewed by me today.     EKG Results     None        XR Results (most recent):  Results from Hospital Encounter encounter on 06/11/15   XR CHEST PA LAT    Narrative PA and lateral Chest    Indication:  Shortness of breath    Comparison:  May 14 2010    Findings: The cardiomediastinal silhouette is normal.  The lungs are clear showing no  masses, infiltrates or effusions. There is no vascular congestion. Thoracic  spondylosis is evident. Impression IMPRESSION:    No active disease       Ms. Ginger Solano has a reminder for a \"due or due soon\" health maintenance. I have asked that she contact her primary care provider for follow-up on this health maintenance. Review of Systems   Constitutional: Negative for chills, fever, malaise/fatigue and weight loss. HENT: Negative for nosebleeds. Eyes: Negative for discharge. Respiratory: Positive for shortness of breath. Negative for cough and wheezing. Cardiovascular: Positive for palpitations and leg swelling. Negative for chest pain, orthopnea, claudication and PND. Gastrointestinal: Negative for diarrhea, nausea and vomiting. Genitourinary: Negative for dysuria and hematuria. Musculoskeletal: Negative for joint pain. Skin: Negative for rash. Neurological: Negative for dizziness, seizures, loss of consciousness and headaches. Endo/Heme/Allergies: Negative for polydipsia. Does not bruise/bleed easily. Psychiatric/Behavioral: Negative for depression and substance abuse. The patient does not have insomnia. Physical Exam   Constitutional: She is oriented to person, place, and time. She appears well-developed and well-nourished. No distress. Sev obese   HENT:   Head: Normocephalic and atraumatic. Mouth/Throat: Normal dentition. Eyes: Right eye exhibits no discharge. Left eye exhibits no discharge. No scleral icterus. Neck: Neck supple. Hepatojugular reflux and JVD present. Carotid bruit is not present. No thyromegaly present. Cardiovascular: Normal rate, regular rhythm, S1 normal, S2 normal, normal heart sounds and intact distal pulses. Exam reveals no gallop and no friction rub. No murmur heard.   Pulmonary/Chest: Effort normal and breath sounds normal. She has no wheezes. She has no rales. Abdominal: Soft. She exhibits no mass. There is no abdominal tenderness. Musculoskeletal:         General: Edema (2-3+) present. Lymphadenopathy:        Right cervical: No superficial cervical adenopathy present. Left cervical: No superficial cervical adenopathy present. Neurological: She is alert and oriented to person, place, and time. Skin: Skin is warm and dry. No rash noted. Psychiatric: She has a normal mood and affect. Her behavior is normal.       ASSESSMENT and PLAN      Diagnoses and all orders for this visit:    1. Acute congestive heart failure, unspecified heart failure type (HCC)  -     hydroCHLOROthiazide (HYDRODIURIL) 25 mg tablet; Take 1 Tab by mouth daily.  -     ECHO ADULT COMPLETE; Future  -     METABOLIC PANEL, BASIC; Future  -     CBC W/O DIFF; Future  -     LIPID PANEL; Future  -     HEPATIC FUNCTION PANEL; Future  -     TSH 3RD GENERATION; Future  -     aspirin delayed-release 81 mg tablet; Take 1 Tab by mouth daily. 2. Syncope, unspecified syncope type  -     [place] EXTERNAL MOBILE CV TELEMETRY W/I&REPORT UP TO 30 DAYS  -     ECHO ADULT COMPLETE; Future  -     aspirin delayed-release 81 mg tablet; Take 1 Tab by mouth daily. 3. Essential hypertension  -     LIPID PANEL; Future    4. Palpitations  -     [place] EXTERNAL MOBILE CV TELEMETRY W/I&REPORT UP TO 30 DAYS    5. Severe obesity (BMI 35.0-39. 9) with comorbidity [facility])        Pertinent laboratory and test data reviewed and discussed with patient.   See patient instructions also for other medical advice given    Medications Discontinued During This Encounter   Medication Reason    Calcium-Cholecalciferol, D3, (Calcium 500 + D) 500 mg(1,250mg) -400 unit tab Therapy Completed    atenoloL (TENORMIN) 25 mg tablet Side Effects    hydroCHLOROthiazide (HYDRODIURIL) 12.5 mg tablet REORDER       Follow-up and Dispositions    · Return in about 6 weeks (around 4/30/2021), or if symptoms worsen or fail to improve, for post test.       3/19/2021   Has acute CHF of unclear etiology. Check echo. As CHF improves will need CAD work-up also  Had syncope and is undergoing neurology work-up but will get a mobile telemetry. Increase HCTZ for hypertension and CHF. Follow-up labs in 2 weeks also. She is trying to lose weight. Mediterranean diet guidelines printed.

## 2021-03-24 ENCOUNTER — HOSPITAL ENCOUNTER (OUTPATIENT)
Age: 76
Discharge: HOME | End: 2021-03-24
Attending: PHYSICAL MEDICINE & REHABILITATION
Payer: MEDICARE

## 2021-03-24 PROCEDURE — 72141 MRI NECK SPINE W/O DYE: CPT

## 2021-03-25 ENCOUNTER — HOSPITAL ENCOUNTER (OUTPATIENT)
Dept: NON INVASIVE DIAGNOSTICS | Age: 76
Discharge: HOME | End: 2021-03-25
Attending: INTERNAL MEDICINE
Payer: MEDICARE

## 2021-03-25 VITALS
WEIGHT: 219 LBS | DIASTOLIC BLOOD PRESSURE: 87 MMHG | HEIGHT: 63 IN | SYSTOLIC BLOOD PRESSURE: 157 MMHG | BODY MASS INDEX: 38.8 KG/M2

## 2021-03-25 DIAGNOSIS — I50.9 ACUTE CONGESTIVE HEART FAILURE, UNSPECIFIED HEART FAILURE TYPE (HCC): ICD-10-CM

## 2021-03-25 DIAGNOSIS — R55 SYNCOPE, UNSPECIFIED SYNCOPE TYPE: ICD-10-CM

## 2021-03-25 LAB
ECHO AO ROOT DIAM: 3.29 CM
ECHO LA AREA 4C: 19.06 CM2
ECHO LA VOL 2C: 30.76 ML (ref 22–52)
ECHO LA VOL 4C: 51.54 ML (ref 22–52)
ECHO LA VOL BP: 45.03 ML (ref 22–52)
ECHO LA VOL/BSA BIPLANE: 22.4 ML/M2 (ref 16–28)
ECHO LA VOLUME INDEX A2C: 15.3 ML/M2 (ref 16–28)
ECHO LA VOLUME INDEX A4C: 25.64 ML/M2 (ref 16–28)
ECHO LV E' LATERAL VELOCITY: 8.47 CM/S
ECHO LV E' SEPTAL VELOCITY: 5.14 CM/S
ECHO LV INTERNAL DIMENSION DIASTOLIC: 3.62 CM (ref 3.9–5.3)
ECHO LV INTERNAL DIMENSION SYSTOLIC: 2.02 CM
ECHO LV IVSD: 1.27 CM (ref 0.6–0.9)
ECHO LV MASS 2D: 132 G (ref 67–162)
ECHO LV MASS INDEX 2D: 65.7 G/M2 (ref 43–95)
ECHO LV POSTERIOR WALL DIASTOLIC: 1.01 CM (ref 0.6–0.9)
ECHO LVOT CARDIAC OUTPUT: 5.98 LITER/MINUTE
ECHO LVOT DIAM: 1.91 CM
ECHO LVOT PEAK GRADIENT: 5.3 MMHG
ECHO LVOT PEAK VELOCITY: 115.13 CM/S
ECHO LVOT SV: 69.7 ML
ECHO LVOT VTI: 24.24 CM
ECHO MV A VELOCITY: 83.79 CM/S
ECHO MV E DECELERATION TIME (DT): 260.47 MS
ECHO MV E VELOCITY: 59.7 CM/S
ECHO MV E/A RATIO: 0.71
ECHO MV E/E' LATERAL: 7.05
ECHO MV E/E' RATIO (AVERAGED): 9.33
ECHO MV E/E' SEPTAL: 11.61
ECHO RV TAPSE: 1.97 CM (ref 1.5–2)
ECHO TV REGURGITANT MAX VELOCITY: 233.28 CM/S
ECHO TV REGURGITANT PEAK GRADIENT: 21.77 MMHG
LVOT MG: 3.34 MMHG

## 2021-03-25 PROCEDURE — 93306 TTE W/DOPPLER COMPLETE: CPT | Performed by: INTERNAL MEDICINE

## 2021-03-25 PROCEDURE — 93306 TTE W/DOPPLER COMPLETE: CPT

## 2021-03-30 ENCOUNTER — OFFICE VISIT (OUTPATIENT)
Dept: ORTHOPEDIC SURGERY | Age: 76
End: 2021-03-30
Payer: MEDICARE

## 2021-03-30 VITALS
RESPIRATION RATE: 18 BRPM | SYSTOLIC BLOOD PRESSURE: 124 MMHG | HEIGHT: 63 IN | BODY MASS INDEX: 38.59 KG/M2 | DIASTOLIC BLOOD PRESSURE: 84 MMHG | HEART RATE: 89 BPM | TEMPERATURE: 97.8 F | OXYGEN SATURATION: 99 % | WEIGHT: 217.8 LBS

## 2021-03-30 DIAGNOSIS — M48.02 CERVICAL SPINAL STENOSIS: ICD-10-CM

## 2021-03-30 DIAGNOSIS — G95.9 CERVICAL MYELOPATHY (HCC): Primary | ICD-10-CM

## 2021-03-30 PROCEDURE — 3017F COLORECTAL CA SCREEN DOC REV: CPT | Performed by: PHYSICAL MEDICINE & REHABILITATION

## 2021-03-30 PROCEDURE — G8536 NO DOC ELDER MAL SCRN: HCPCS | Performed by: PHYSICAL MEDICINE & REHABILITATION

## 2021-03-30 PROCEDURE — G8754 DIAS BP LESS 90: HCPCS | Performed by: PHYSICAL MEDICINE & REHABILITATION

## 2021-03-30 PROCEDURE — 99213 OFFICE O/P EST LOW 20 MIN: CPT | Performed by: PHYSICAL MEDICINE & REHABILITATION

## 2021-03-30 PROCEDURE — G8417 CALC BMI ABV UP PARAM F/U: HCPCS | Performed by: PHYSICAL MEDICINE & REHABILITATION

## 2021-03-30 PROCEDURE — G8752 SYS BP LESS 140: HCPCS | Performed by: PHYSICAL MEDICINE & REHABILITATION

## 2021-03-30 PROCEDURE — G8399 PT W/DXA RESULTS DOCUMENT: HCPCS | Performed by: PHYSICAL MEDICINE & REHABILITATION

## 2021-03-30 PROCEDURE — G8427 DOCREV CUR MEDS BY ELIG CLIN: HCPCS | Performed by: PHYSICAL MEDICINE & REHABILITATION

## 2021-03-30 PROCEDURE — 1101F PT FALLS ASSESS-DOCD LE1/YR: CPT | Performed by: PHYSICAL MEDICINE & REHABILITATION

## 2021-03-30 PROCEDURE — 1090F PRES/ABSN URINE INCON ASSESS: CPT | Performed by: PHYSICAL MEDICINE & REHABILITATION

## 2021-03-30 PROCEDURE — G8510 SCR DEP NEG, NO PLAN REQD: HCPCS | Performed by: PHYSICAL MEDICINE & REHABILITATION

## 2021-03-30 NOTE — PATIENT INSTRUCTIONS
Cervical Spinal Stenosis: Care Instructions Your Care Instructions Spinal stenosis is a narrowing of the canal that surrounds the spinal cord and nerve roots. Sometimes bone and other tissue grow into this canal and press on the nerves that branch out from the spinal cord. This can happen as a part of aging. When the narrowing happens in your neck, it's called cervical spinal stenosis. It often causes stiffness, pain, numbness, and weakness in the neck, shoulders, arms, hands, or legs. It can even cause problems with your balance, coordination, and bowel or bladder control. But some people have no symptoms. You may be able to get relief from the symptoms of spinal stenosis by taking pain medicine. Your doctor may suggest physical therapy and exercises to keep your spine strong and flexible. Some people try steroid shots to reduce swelling. If pain and numbness in your neck, arms, or legs are still so bad that you cannot do your normal activities, you may need surgery. Follow-up care is a key part of your treatment and safety. Be sure to make and go to all appointments, and call your doctor if you are having problems. It's also a good idea to know your test results and keep a list of the medicines you take. How can you care for yourself at home? · Ask your doctor if you can take an over-the-counter pain medicine, such as acetaminophen (Tylenol), ibuprofen (Advil, Motrin), or naproxen (Aleve). Be safe with medicines. Read and follow all instructions on the label. · Do not take two or more pain medicines at the same time unless the doctor told you to. Many pain medicines have acetaminophen, which is Tylenol. Too much acetaminophen (Tylenol) can be harmful. · Change positions often when you are standing or sitting. This may reduce pressure on the spinal cord and its nerves. · When you rest, use pillows or towel rolls to support your neck and head in a comfortable position.  
· Follow your doctor's instructions about activity. He or she may tell you not to do sports or activities that could injure your neck. · Stretch your neck and shoulders as your doctor or physical therapist recommends. If your doctor says it is okay to do them, these exercises may help: ? Neck stretches to the side. Keep your shoulders relaxed and slowly tilt your head straight over toward one shoulder. Hold for 15 seconds. Let the weight of your head stretch your muscles. Then do the same toward the other shoulder. ? Neck rotations. Keep your chin level and slowly turn your head to one side. Hold for 15 seconds. Then do the same to the other side. ? Shoulder rolls. Roll your shoulders up, then back, and then down in a smooth, circular motion. Repeat several times. When should you call for help? Call 911 anytime you think you may need emergency care. For example, call if: 
  · You are unable to move an arm or a leg at all. Call your doctor now or seek immediate medical care if: 
  · You have new or worse symptoms in your arms, legs, belly, or buttocks. Symptoms may include: 
? Numbness or tingling. ? Weakness. ? Pain.  
  · You lose bladder or bowel control. Watch closely for changes in your health, and be sure to contact your doctor if: 
  · You do not get better as expected. Where can you learn more? Go to http://www.gray.com/ Enter  in the search box to learn more about \"Cervical Spinal Stenosis: Care Instructions. \" Current as of: March 2, 2020               Content Version: 12.6 © 2006-2020 [employer]. Care instructions adapted under license by [employer] (which disclaims liability or warranty for this information). If you have questions about a medical condition or this instruction, always ask your healthcare professional. [place] [address] any warranty or liability for your use of this information.

## 2021-03-30 NOTE — PROGRESS NOTES
[address], [address]  [place], [address] [place] [address] Street  Phone: [phone]  Fax: [phone]        Reza Luna  : 1945  PCP: Freda Montgomery NP  3/30/2021    PROGRESS NOTE      HISTORY OF PRESENT ILLNESS  Alix Hudson is a 76 y.o. female who was seen as a new patient 3/1/2021 for BUE EMG and c/o bilateral hand numbness and tingling. She is LHD. Pt notes that she was previously told by another physician in the past that she had a pinched nerve in her neck, but she never had imaging or treatment. Pt notes that she has had falls recently. A BUE EMG dated 3/1/21 was suggestive of  1. Mild median mononeuropathy at the right wrist (carpal tunnel syndrome) 2. Chronic left C5 radiculopathy - this is based on signs of muscle membrane instability and chronicity in the left deltoid and biceps. Alix Hudson comes in to the office today for f/u. Cervical spine MRI dated 3/24/21 reviewed. Per report, Moderate to severe multifactorial spinal canal stenosis at C2/C3 through C6/C7, on the basis of degenerative disc disease, uncovertebral arthritis, facet  arthritis, and ligamentum flavum buckling. Myelomalacia at C3/C4. Multilevel foraminal stenosis which is most significant moderate to severe at right C2/C3, right C3/C4, bilateral C5/C6 and bilateral C6/C7 levels. Mild multilevel Modic degenerative endplate changes, a potential pain generator. Pain Score: 4/10. PmHx: HTN    ASSESSMENT  This is a 76year-old female who was seen for a BUE EMG for evaluation of bilateral hand numbness and tingling. EMG results suggestive of right carpal tunnel syndrome and a chronic left C5 radiculopathy. Her cervical MRI showed moderate to severe multilevel cervical spinal stenosis with myelomalacia at C3/4. Based on these MRI and EMG findings, I would like to have her evaluated from a surgical standpoint given her falls and positive Waters's sign. PLAN  1. Referral to Dr. Isidra Reed for surgical consult. Pt will f/u with Dr. Isidra Reed for surgical consult. Diagnoses and all orders for this visit:    1. Cervical myelopathy (HCC)  -     REFERRAL TO SPINE SURGERY    2. Cervical spinal stenosis  -     REFERRAL TO SPINE SURGERY         PAST MEDICAL HISTORY   Past Medical History:   Diagnosis Date    Asthma     COVID-19     Diverticulitis     Hematuria     Hypertension     Positive PPD     Sinusitis     Syncope     Vertigo        Past Surgical History:   Procedure Laterality Date    ENDOSCOPY, COLON, DIAGNOSTIC  01/29/2008    HX GYN      Fibroid Tumorectomy    HX KNEE ARTHROSCOPY  5/26/10    R knee   . MEDICATIONS    Current Outpatient Medications   Medication Sig Dispense Refill    hydroCHLOROthiazide (HYDRODIURIL) 25 mg tablet Take 1 Tab by mouth daily. 30 Tab 1    aspirin delayed-release 81 mg tablet Take 1 Tab by mouth daily. 100 Tab prn    predniSONE (STERAPRED DS) 10 mg dose pack See administration instruction per 10mg dose pack 21 Tab 0    albuterol (PROVENTIL HFA, VENTOLIN HFA, PROAIR HFA) 90 mcg/actuation inhaler 1 OR 2 PUFFS EVERY 4 HRS AS NEEDED      amLODIPine (NORVASC) 2.5 mg tablet TAKE 1 TABLET BY MOUTH EVERY DAY      ammonium lactate (LAC-HYDRIN) 12 % lotion DIME  SIZED AMOUTN TWICE A DAY TO LEGS 90 DAYS      cloNIDine HCl (CATAPRES) 0.2 mg tablet Take 0.2 mg by mouth two (2) times a day.           ALLERGIES  No Known Allergies       SOCIAL HISTORY    Social History     Socioeconomic History    Marital status: SINGLE     Spouse name: Not on file    Number of children: Not on file    Years of education: Not on file    Highest education level: Not on file   Tobacco Use    Smoking status: Never Smoker    Smokeless tobacco: Never Used   Substance and Sexual Activity    Alcohol use: No    Drug use: Never       FAMILY HISTORY  Family History   Problem Relation Age of Onset    Cancer Mother     Hypertension Father  Stroke Father     Cancer Sister          REVIEW OF SYSTEMS  Review of Systems   Constitutional: Negative for chills, fever and weight loss. Respiratory: Negative for shortness of breath. Cardiovascular: Negative for chest pain. Gastrointestinal: Negative for constipation. Negative for fecal incontinence    Genitourinary: Negative for dysuria. Negative for urinary incontinence   Musculoskeletal: Positive for falls. Skin: Negative for rash. Neurological: Positive for tingling ( bilateral hands). Negative for dizziness, tremors, focal weakness and headaches. Endo/Heme/Allergies: Does not bruise/bleed easily. Psychiatric/Behavioral: The patient does not have insomnia. PHYSICAL EXAMINATION  Visit Vitals  /84 (BP 1 Location: Right arm, BP Patient Position: Sitting)   Pulse 89   Temp 97.8 °F (36.6 °C)   Resp 18   Ht 5' 3\" (1.6 m)   Wt 217 lb 12.8 oz (98.8 kg)   SpO2 99%   BMI 38.58 kg/m²       Pain Assessment  3/1/2021   Location of Pain Hand;Wrist   Location Modifiers Left;Right   Severity of Pain 0   Quality of Pain -   Quality of Pain Comment -   Duration of Pain Persistent   Frequency of Pain (No Data)   Frequency of Pain Comment n/t   Limiting Behavior -   Result of Injury -           Constitutional:  Well developed, well nourished, in no acute distress. Psychiatric: Affect and mood are appropriate. Integumentary: No rashes or abrasions noted on exposed areas. SPINE/MUSCULOSKELETAL EXAM    Cervical spine:  Neck is midline. Normal muscle tone. No focal atrophy is noted. ROM pain free. Shoulder ROM intact. No tenderness to palpation. Negative Spurling's sign. Negative Tinel's sign. Positive Waters's sign bilaterally. Negative Vaz sign on the L. Sensation in the bilateral arms grossly intact to light touch.    Strength intact.     No clonus at feet. DTRs are +2. MOTOR:      Biceps  Triceps Deltoids Wrist Ext Wrist Flex Hand Intrin   Right 5/5 5/5 5/5 5/5 5/5 5/5   Left 5/5 5/5 5/5 5/5 5/5 5/5             Hip Flex  Quads Hamstrings Ankle DF EHL Ankle PF   Right 5/5 5/5 5/5 5/5 5/5 5/5   Left 5/5 5/5 5/5 5/5 5/5 5/5     RADIOGRAPHS  Cervical MRI images taken on 3/24/21 personally reviewed with patient:  Alignment: 2 mm retrolisthesis of C2 on C3, 2 mm anterolisthesis of C4 on C5. Straightening of the cervical lordosis. Mid thoracic dextroscoliosis.     Vertebral body heights: Cervical vertebral body heights are preserved. No acute  fracture.     Marrow signal: No suspicious marrow replacing lesion appreciated. There is no  evidence of diffuse marrow infiltrative process. Mild Modic type degenerative  endplate changes at I1/C6, C6/C7, and T1/T2. Small right C3/C4 facet joint joint  effusion with mild surrounding marrow soft tissue and marrow edema, likely  reflecting osteoarthritis with synovitis.      Cord: There is no appreciable cord signal abnormality. Sharply marginated cord  T2 hyperintense signal at the bilateral C3/C4 level, likely reflecting  myelomalacia.     Cerebellar tonsils: There is no Chiari 1 malformation.     Craniocervical junction: The craniocervical junction is congruent and intact.     Right retropharyngeal carotid artery.     The visualized paraspinal soft tissues are otherwise unremarkable.     Disc levels:     C2/C3: Posterior endplate uncovertebral spurring. Small disc bulge. Ligamentum  flavum buckling. Right greater than left facet joint spurring. Spinal canal: Moderate stenosis  Right foramen: Moderate to severe stenosis. Left foramen: Mild stenosis.     C3/C4: Small disc bulge. Posterior endplate and bilateral uncovertebral  spurring. Bilateral facet joint spurring. Ligamentum flavum buckling. [facility] Spinal canal: Severe stenosis and compression of the cord.   Right foramen: Severe stenosis. Left foramen: Mild stenosis.     C4/C5: Moderate-sized central protrusion. Posterior endplate and bilateral  uncovertebral spurring. Bilateral facet joint spurring. Ligamentum flavum  buckling. Spinal canal: Moderate to severe stenosis. Right foramen: Moderate stenosis. Left foramen: Moderate stenosis.     C5/C6: Moderate-sized central protrusion. Posterior endplate and bilateral  uncovertebral spurring. Ligamentum flavum buckling. Spinal canal: Severe stenosis. Right foramen: Severe stenosis. Left foramen: Severe stenosis.     C6/C7: Moderate-sized left paracentral protrusion. Posterior endplate and  bilateral uncovertebral spurring. Spinal canal: Moderate to severe stenosis. Right foramen: Moderate to severe stenosis  Left foramen: Moderate to severe stenosis     C7/T1: Small disc bulge. Mild posterior endplate spurring. Dorsal epidural fat  proliferation. Ligamentum flavum buckling. Spinal canal: Mild to moderate stenosis. Right foramen: Mild stenosis. Left foramen: Mild stenosis.     T1/T2: Small bilateral foraminal protrusions. Ligamentum flavum buckling. Mild  spinal canal stenosis. Mild to moderate bilateral foraminal stenosis.     No evidence of high-grade spinal foraminal stenosis in the visualized superior  thoracic spine up to the T4/T5 level.        IMPRESSION     1. Moderate to severe multifactorial spinal canal stenosis at C2/C3 through  C6/C7, on the basis of degenerative disc disease, uncovertebral arthritis, facet  arthritis, and ligamentum flavum buckling.  -Myelomalacia at C3/C4.     2. Multilevel foraminal stenosis which is most significant moderate to severe at  right C2/C3, right C3/C4, bilateral C5/C6 and bilateral C6/C7 levels.     3. Mild multilevel Modic degenerative endplate changes, a potential pain  generator.     BUE EMG by Dr. Michael Marvin on 3/1/21:  NCV & EMG Findings:  Evaluation of the right median/radial (dig I) comparison nerve showed prolonged distal peak latency (Median, 3.0 ms) and abnormal peak latency difference (Median-Radial, 0.6 ms). The right median/ulnar (dig IV) comparison nerve showed prolonged distal peak latency (Median Wr, 3.8 ms) and abnormal peak latency difference (Median Wr-Ulnar Wr, 0.6 ms). All remaining nerves (as indicated in the following tables) were within normal limits. Left vs. Right side comparison data for the median sensory nerve indicates abnormal L-R latency difference (0.5 ms). All remaining left vs. right side differences were within normal limits.       Needle evaluation of the left biceps muscle showed increased motor unit amplitude. The left deltoid muscle showed increased insertional activity, slightly increased spontaneous activity, and slightly increased polyphasic potentials. All remaining muscles (as indicated in the following table) showed no evidence of electrical instability.       INTERPRETATION  This is an abnormal electrodiagnostic examination. These findings may be consistent with:   1. Mild median mononeuropathy at the right wrist (carpal tunnel syndrome)   2. Chronic left C5 radiculopathy - this is based on signs of muscle membrane instability and chronicity in the left deltoid and biceps. 20 minutes of face-to-face contact were spent with the patient during today's visit extensively discussing symptoms and treatment plan. All questions were answered. More than half of this visit today was spent on counseling.      Written by Roni Velasquez as dictated by Josephine Glass MD

## 2021-04-01 ENCOUNTER — LAB ONLY (OUTPATIENT)
Dept: FAMILY MEDICINE CLINIC | Age: 76
End: 2021-04-01
Payer: MEDICARE

## 2021-04-01 ENCOUNTER — HOSPITAL ENCOUNTER (OUTPATIENT)
Dept: LAB | Age: 76
Discharge: HOME | End: 2021-04-01
Payer: MEDICARE

## 2021-04-01 DIAGNOSIS — N17.9 AKI (ACUTE KIDNEY INJURY) (HCC): ICD-10-CM

## 2021-04-01 DIAGNOSIS — I50.9 ACUTE CONGESTIVE HEART FAILURE, UNSPECIFIED HEART FAILURE TYPE (HCC): ICD-10-CM

## 2021-04-01 DIAGNOSIS — Z01.89 ENCOUNTER FOR LABORATORY EXAMINATION: Primary | ICD-10-CM

## 2021-04-01 DIAGNOSIS — Z13.6 SCREENING FOR CARDIOVASCULAR CONDITION: ICD-10-CM

## 2021-04-01 DIAGNOSIS — I10 ESSENTIAL HYPERTENSION: ICD-10-CM

## 2021-04-01 DIAGNOSIS — Z13.820 SCREENING FOR OSTEOPOROSIS: ICD-10-CM

## 2021-04-01 DIAGNOSIS — Z13.228 SCREENING FOR ENDOCRINE, METABOLIC AND IMMUNITY DISORDER: ICD-10-CM

## 2021-04-01 DIAGNOSIS — Z13.0 SCREENING FOR ENDOCRINE, METABOLIC AND IMMUNITY DISORDER: ICD-10-CM

## 2021-04-01 DIAGNOSIS — Z13.29 SCREENING FOR ENDOCRINE, METABOLIC AND IMMUNITY DISORDER: ICD-10-CM

## 2021-04-01 LAB
ALBUMIN SERPL-MCNC: 3.5 G/DL (ref 3.4–5)
ALBUMIN SERPL-MCNC: 3.6 G/DL (ref 3.4–5)
ALBUMIN/GLOB SERPL: 0.9 {RATIO} (ref 0.8–1.7)
ALBUMIN/GLOB SERPL: 0.9 {RATIO} (ref 0.8–1.7)
ALP SERPL-CCNC: 97 U/L (ref 45–117)
ALP SERPL-CCNC: 99 U/L (ref 45–117)
ALT SERPL-CCNC: 26 U/L (ref 13–56)
ALT SERPL-CCNC: 27 U/L (ref 13–56)
ANION GAP SERPL CALC-SCNC: 8 MMOL/L (ref 3–18)
ANION GAP SERPL CALC-SCNC: 8 MMOL/L (ref 3–18)
AST SERPL-CCNC: 18 U/L (ref 10–38)
AST SERPL-CCNC: 19 U/L (ref 10–38)
BILIRUB DIRECT SERPL-MCNC: 0.2 MG/DL (ref 0–0.2)
BILIRUB SERPL-MCNC: 0.6 MG/DL (ref 0.2–1)
BILIRUB SERPL-MCNC: 0.6 MG/DL (ref 0.2–1)
BUN SERPL-MCNC: 9 MG/DL (ref 7–18)
BUN SERPL-MCNC: 9 MG/DL (ref 7–18)
BUN/CREAT SERPL: 8 (ref 12–20)
BUN/CREAT SERPL: 8 (ref 12–20)
CALCIUM SERPL-MCNC: 9.1 MG/DL (ref 8.5–10.1)
CALCIUM SERPL-MCNC: 9.2 MG/DL (ref 8.5–10.1)
CHLORIDE SERPL-SCNC: 106 MMOL/L (ref 100–111)
CHLORIDE SERPL-SCNC: 107 MMOL/L (ref 100–111)
CHOLEST SERPL-MCNC: 201 MG/DL
CHOLEST SERPL-MCNC: 211 MG/DL
CO2 SERPL-SCNC: 29 MMOL/L (ref 21–32)
CO2 SERPL-SCNC: 29 MMOL/L (ref 21–32)
CREAT SERPL-MCNC: 1.08 MG/DL (ref 0.6–1.3)
CREAT SERPL-MCNC: 1.12 MG/DL (ref 0.6–1.3)
ERYTHROCYTE [DISTWIDTH] IN BLOOD BY AUTOMATED COUNT: 16.9 % (ref 11.6–14.5)
GLOBULIN SER CALC-MCNC: 3.8 G/DL (ref 2–4)
GLOBULIN SER CALC-MCNC: 4 G/DL (ref 2–4)
GLUCOSE SERPL-MCNC: 100 MG/DL (ref 74–99)
GLUCOSE SERPL-MCNC: 104 MG/DL (ref 74–99)
HCT VFR BLD AUTO: 38.6 % (ref 35–45)
HDLC SERPL-MCNC: 73 MG/DL (ref 40–60)
HDLC SERPL-MCNC: 73 MG/DL (ref 40–60)
HDLC SERPL: 2.8 {RATIO} (ref 0–5)
HDLC SERPL: 2.9 {RATIO} (ref 0–5)
HGB BLD-MCNC: 12.6 G/DL (ref 12–16)
LDLC SERPL CALC-MCNC: 106.8 MG/DL (ref 0–100)
LDLC SERPL CALC-MCNC: 97.8 MG/DL (ref 0–100)
LIPID PROFILE,FLP: ABNORMAL
LIPID PROFILE,FLP: ABNORMAL
MCH RBC QN AUTO: 28.8 PG (ref 24–34)
MCHC RBC AUTO-ENTMCNC: 32.6 G/DL (ref 31–37)
MCV RBC AUTO: 88.1 FL (ref 74–97)
PLATELET # BLD AUTO: 355 K/UL (ref 135–420)
PMV BLD AUTO: 10.6 FL (ref 9.2–11.8)
POTASSIUM SERPL-SCNC: 3.3 MMOL/L (ref 3.5–5.5)
POTASSIUM SERPL-SCNC: 3.4 MMOL/L (ref 3.5–5.5)
PROT SERPL-MCNC: 7.4 G/DL (ref 6.4–8.2)
PROT SERPL-MCNC: 7.5 G/DL (ref 6.4–8.2)
RBC # BLD AUTO: 4.38 M/UL (ref 4.2–5.3)
SODIUM SERPL-SCNC: 143 MMOL/L (ref 136–145)
SODIUM SERPL-SCNC: 144 MMOL/L (ref 136–145)
TRIGL SERPL-MCNC: 151 MG/DL (ref ?–150)
TRIGL SERPL-MCNC: 156 MG/DL (ref ?–150)
TSH SERPL DL<=0.05 MIU/L-ACNC: 0.91 UIU/ML (ref 0.36–3.74)
VLDLC SERPL CALC-MCNC: 30.2 MG/DL
VLDLC SERPL CALC-MCNC: 31.2 MG/DL
WBC # BLD AUTO: 5.9 K/UL (ref 4.6–13.2)

## 2021-04-01 PROCEDURE — 36415 COLL VENOUS BLD VENIPUNCTURE: CPT | Performed by: NURSE PRACTITIONER

## 2021-04-01 PROCEDURE — 80061 LIPID PANEL: CPT

## 2021-04-01 PROCEDURE — 80053 COMPREHEN METABOLIC PANEL: CPT

## 2021-04-01 PROCEDURE — 85027 COMPLETE CBC AUTOMATED: CPT

## 2021-04-01 PROCEDURE — 86803 HEPATITIS C AB TEST: CPT

## 2021-04-01 PROCEDURE — 84443 ASSAY THYROID STIM HORMONE: CPT

## 2021-04-01 PROCEDURE — 80076 HEPATIC FUNCTION PANEL: CPT

## 2021-04-01 NOTE — PROGRESS NOTES
Patient presents for lab draw ordered by Dr Oleg Schaefer NP and Dr. Vee Umaña  Ordering Department/Practice:  [facility]  Date Ordered:  2- and 3-     The following labs were drawn and sent to [facility]     CBC, Lipid Profile, CMP, TSH, 3rd Generation and Hepatic Function, Hepatitis C AB     The following tubes were sent:    Gold  ( 2) and Lavender  ( 1)    Draw site:  LAC  Pain Level:0  Needle Gauge23  Aseptic technique used  Blood thinners:n  Band-Aid applied  Draw fee added   Procedure tolerated well, patient voiced no complaints.

## 2021-04-02 LAB
HCV AB SER IA-ACNC: 0.07 INDEX
HCV AB SERPL QL IA: NEGATIVE
HCV COMMENT,HCGAC: NORMAL

## 2021-04-08 ENCOUNTER — OFFICE VISIT (OUTPATIENT)
Dept: FAMILY MEDICINE CLINIC | Age: 76
End: 2021-04-08
Payer: MEDICARE

## 2021-04-08 VITALS
HEIGHT: 64 IN | HEART RATE: 69 BPM | TEMPERATURE: 98.8 F | WEIGHT: 215 LBS | RESPIRATION RATE: 20 BRPM | DIASTOLIC BLOOD PRESSURE: 71 MMHG | SYSTOLIC BLOOD PRESSURE: 126 MMHG | BODY MASS INDEX: 36.7 KG/M2 | OXYGEN SATURATION: 99 %

## 2021-04-08 DIAGNOSIS — Z13.31 SCREENING FOR DEPRESSION: ICD-10-CM

## 2021-04-08 DIAGNOSIS — Z00.00 MEDICARE ANNUAL WELLNESS VISIT, SUBSEQUENT: Primary | ICD-10-CM

## 2021-04-08 DIAGNOSIS — Z71.89 ACP (ADVANCE CARE PLANNING): ICD-10-CM

## 2021-04-08 PROBLEM — N17.9 AKI (ACUTE KIDNEY INJURY) (HCC): Status: ACTIVE | Noted: 2021-01-28

## 2021-04-08 PROBLEM — J18.9 PNEUMONIA: Status: ACTIVE | Noted: 2021-01-28

## 2021-04-08 PROBLEM — I50.9 ACUTE CONGESTIVE HEART FAILURE (HCC): Status: ACTIVE | Noted: 2021-04-08

## 2021-04-08 PROBLEM — R41.82 AMS (ALTERED MENTAL STATUS): Status: ACTIVE | Noted: 2021-01-28

## 2021-04-08 PROBLEM — R00.2 PALPITATIONS: Status: ACTIVE | Noted: 2021-04-08

## 2021-04-08 PROBLEM — Z20.822 SUSPECTED COVID-19 VIRUS INFECTION: Status: ACTIVE | Noted: 2021-01-28

## 2021-04-08 PROBLEM — R55 SYNCOPE: Status: ACTIVE | Noted: 2021-04-08

## 2021-04-08 PROCEDURE — G8399 PT W/DXA RESULTS DOCUMENT: HCPCS | Performed by: NURSE PRACTITIONER

## 2021-04-08 PROCEDURE — G8417 CALC BMI ABV UP PARAM F/U: HCPCS | Performed by: NURSE PRACTITIONER

## 2021-04-08 PROCEDURE — G8427 DOCREV CUR MEDS BY ELIG CLIN: HCPCS | Performed by: NURSE PRACTITIONER

## 2021-04-08 PROCEDURE — G8432 DEP SCR NOT DOC, RNG: HCPCS | Performed by: NURSE PRACTITIONER

## 2021-04-08 PROCEDURE — G8536 NO DOC ELDER MAL SCRN: HCPCS | Performed by: NURSE PRACTITIONER

## 2021-04-08 PROCEDURE — G0439 PPPS, SUBSEQ VISIT: HCPCS | Performed by: NURSE PRACTITIONER

## 2021-04-08 PROCEDURE — 3017F COLORECTAL CA SCREEN DOC REV: CPT | Performed by: NURSE PRACTITIONER

## 2021-04-08 PROCEDURE — G8752 SYS BP LESS 140: HCPCS | Performed by: NURSE PRACTITIONER

## 2021-04-08 PROCEDURE — G8754 DIAS BP LESS 90: HCPCS | Performed by: NURSE PRACTITIONER

## 2021-04-08 PROCEDURE — 1101F PT FALLS ASSESS-DOCD LE1/YR: CPT | Performed by: NURSE PRACTITIONER

## 2021-04-08 NOTE — ACP (ADVANCE CARE PLANNING)
Advance Care Planning     Advance Care Planning (ACP) Physician/NP/PA Conversation      Date of Conversation: 4/8/2021  Conducted with: Patient with [address] Decision Maker:     Click here to complete [address] including selection of the [address] Relationship (ie \"Primary\")  Today we documented Decision Maker(s) consistent with Legal Next of Kin hierarchy. Care Preferences:    Hospitalization: \"If your health worsens and it becomes clear that your chance of recovery is unlikely, what would be your preference regarding hospitalization? \"  The patient is unsure. Ventilation: \"If you were unable to breathe on your own and your chance of recovery was unlikely, what would be your preference about the use of a ventilator (breathing machine) if it was available to you? \"   The patient would NOT desire the use of a ventilator. Resuscitation: \"In the event your heart stopped as a result of an underlying serious health condition, would you want attempts to be made to restart your heart, or would you prefer a natural death? \"   The patient is unsure.     Additional topics discussed: treatment goals    Conversation Outcomes / Follow-Up Plan:   ACP complete - no further action today  Reviewed DNR/DNI and patient elects Full Code (Attempt Resuscitation)     Length of Voluntary ACP Conversation in minutes:  <16 minutes (Non-Billable)    Nathaniel Santos, NP

## 2021-04-08 NOTE — PATIENT INSTRUCTIONS
Medicare Wellness Visit, Female The best way to live healthy is to have a lifestyle where you eat a well-balanced diet, exercise regularly, limit alcohol use, and quit all forms of tobacco/nicotine, if applicable. Regular preventive services are another way to keep healthy. Preventive services (vaccines, screening tests, monitoring & exams) can help personalize your care plan, which helps you manage your own care. Screening tests can find health problems at the earliest stages, when they are easiest to treat. [facility] follows the current, evidence-based guidelines published by the [facility] [place] ([facility]) when recommending preventive services for our patients. Because we follow these guidelines, sometimes recommendations change over time as research supports it. (For example, mammograms used to be recommended annually. Even though Medicare will still pay for an annual mammogram, the newer guidelines recommend a mammogram every two years for women of average risk). Of course, you and your doctor may decide to screen more often for some diseases, based on your risk and your co-morbidities (chronic disease you are already diagnosed with). Preventive services for you include: - Medicare offers their members a free annual wellness visit, which is time for you and your primary care provider to discuss and plan for your preventive service needs. Take advantage of this benefit every year! 
-All adults over the age of 72 should receive the recommended pneumonia vaccines. Current USPSTF guidelines recommend a series of two vaccines for the best pneumonia protection.  
-All adults should have a flu vaccine yearly and a tetanus vaccine every 10 years.  
-All adults age 48 and older should receive the shingles vaccines (series of two vaccines).      
-All adults age 38-68 who are overweight should have a diabetes screening test once every three years.  
-All adults born between [birth year] and 1965 should be screened once for Hepatitis C. 
-Other screening tests and preventive services for persons with diabetes include: an eye exam to screen for diabetic retinopathy, a kidney function test, a foot exam, and stricter control over your cholesterol.  
-Cardiovascular screening for adults with routine risk involves an electrocardiogram (ECG) at intervals determined by your doctor.  
-Colorectal cancer screenings should be done for adults age 54-65 with no increased risk factors for colorectal cancer. There are a number of acceptable methods of screening for this type of cancer. Each test has its own benefits and drawbacks. Discuss with your doctor what is most appropriate for you during your annual wellness visit. The different tests include: colonoscopy (considered the best screening method), a fecal occult blood test, a fecal DNA test, and sigmoidoscopy. 
 
-A bone mass density test is recommended when a woman turns 65 to screen for osteoporosis. This test is only recommended one time, as a screening. Some providers will use this same test as a disease monitoring tool if you already have osteoporosis. -Breast cancer screenings are recommended every other year for women of normal risk, age 54-69. 
-Cervical cancer screenings for women over age 72 are only recommended with certain risk factors. Here is a list of your current Health Maintenance items (your personalized list of preventive services) with a due date: 
Health Maintenance Due Topic Date Due  
 COVID-19 Vaccine (1) Never done  DTaP/Tdap/Td  (1 - Tdap) Never done  Shingles Vaccine (1 of 2) Never done  Pneumococcal Vaccine (1 of 1 - PPSV23) Never done  Annual Well Visit  Never done Advance Directives: Care Instructions Overview An advance directive is a legal way to state your wishes at the end of your life. It tells your family and your doctor what to do if you can't say what you want. There are two main types of advance directives. You can change them any time your wishes change. Living will. This form tells your family and your doctor your wishes about life support and other treatment. The form is also called a declaration. Medical power of . This form lets you name a person to make treatment decisions for you when you can't speak for yourself. This person is called a health care agent (health care proxy, health care surrogate). The form is also called a durable power of  for health care. If you do not have an advance directive, decisions about your medical care may be made by a family member, or by a doctor or a  who doesn't know you. It may help to think of an advance directive as a gift to the people who care for you. If you have one, they won't have to make tough decisions by themselves. Follow-up care is a key part of your treatment and safety. Be sure to make and go to all appointments, and call your doctor if you are having problems. It's also a good idea to know your test results and keep a list of the medicines you take. What should you include in an advance directive? Many states have a unique advance directive form. (It may ask you to address specific issues.) Or you might use a universal form that's approved by many states. If your form doesn't tell you what to address, it may be hard to know what to include in your advance directive. Use the questions below to help you get started. · Who do you want to make decisions about your medical care if you are not able to? · What life-support measures do you want if you have a serious illness that gets worse over time or can't be cured? · What are you most afraid of that might happen? (Maybe you're afraid of having pain, losing your independence, or being kept alive by machines.) · Where would you prefer to die? (Your home? A hospital? A nursing home?) · Do you want to donate your organs when you die? · Do you want certain Orthodoxy practices performed before you die? When should you call for help? Be sure to contact your doctor if you have any questions. Where can you learn more? Go to http://www.gray.com/ Enter R264 in the search box to learn more about \"Advance Directives: Care Instructions. \" Current as of: July 17, 2020               Content Version: 12.8 © 2006-2021 [employer]. Care instructions adapted under license by [employer] (which disclaims liability or warranty for this information). If you have questions about a medical condition or this instruction, always ask your healthcare professional. [place] [address] any warranty or liability for your use of this information. Medicare Wellness Visit, Female The best way to live healthy is to have a lifestyle where you eat a well-balanced diet, exercise regularly, limit alcohol use, and quit all forms of tobacco/nicotine, if applicable. Regular preventive services are another way to keep healthy. Preventive services (vaccines, screening tests, monitoring & exams) can help personalize your care plan, which helps you manage your own care. Screening tests can find health problems at the earliest stages, when they are easiest to treat. [facility] follows the current, evidence-based guidelines published by the [facility] [place] (USPSTF) when recommending preventive services for our patients. Because we follow these guidelines, sometimes recommendations change over time as research supports it. (For example, mammograms used to be recommended annually. Even though Medicare will still pay for an annual mammogram, the newer guidelines recommend a mammogram every two years for women of average risk).   Of course, you and your doctor may decide to screen more often for some diseases, based on your risk and your co-morbidities (chronic disease you are already diagnosed with). Preventive services for you include: - Medicare offers their members a free annual wellness visit, which is time for you and your primary care provider to discuss and plan for your preventive service needs. Take advantage of this benefit every year! 
-All adults over the age of 72 should receive the recommended pneumonia vaccines. Current USPSTF guidelines recommend a series of two vaccines for the best pneumonia protection.  
-All adults should have a flu vaccine yearly and a tetanus vaccine every 10 years.  
-All adults age 48 and older should receive the shingles vaccines (series of two vaccines). -All adults age 38-68 who are overweight should have a diabetes screening test once every three years.  
-All adults born between 80 and 1965 should be screened once for Hepatitis C. 
-Other screening tests and preventive services for persons with diabetes include: an eye exam to screen for diabetic retinopathy, a kidney function test, a foot exam, and stricter control over your cholesterol.  
-Cardiovascular screening for adults with routine risk involves an electrocardiogram (ECG) at intervals determined by your doctor.  
-Colorectal cancer screenings should be done for adults age 54-65 with no increased risk factors for colorectal cancer. There are a number of acceptable methods of screening for this type of cancer. Each test has its own benefits and drawbacks. Discuss with your doctor what is most appropriate for you during your annual wellness visit. The different tests include: colonoscopy (considered the best screening method), a fecal occult blood test, a fecal DNA test, and sigmoidoscopy. 
 
-A bone mass density test is recommended when a woman turns 65 to screen for osteoporosis. This test is only recommended one time, as a screening. Some providers will use this same test as a disease monitoring tool if you already have osteoporosis.  
-Breast cancer screenings are recommended every other year for women of normal risk, age 54-69. 
-Cervical cancer screenings for women over age 72 are only recommended with certain risk factors. Here is a list of your current Health Maintenance items (your personalized list of preventive services) with a due date: 
Health Maintenance Due Topic Date Due  
 DTaP/Tdap/Td  (1 - Tdap) Never done  Shingles Vaccine (1 of 2) Never done  Pneumococcal Vaccine (1 of 1 - PPSV23) Never done

## 2021-04-08 NOTE — PROGRESS NOTES
Elayne Lee presents today for   Chief Complaint   Patient presents with    Annual Wellness Visit            Is someone accompanying this pt? no    Is the patient using any DME equipment during OV? no    Depression Screening:  3 most recent PHQ Screens 4/8/2021   Little interest or pleasure in doing things Not at all   Feeling down, depressed, irritable, or hopeless Not at all   Total Score PHQ 2 0   Trouble falling or staying asleep, or sleeping too much Not at all   Feeling tired or having little energy Not at all   Poor appetite, weight loss, or overeating Not at all   Feeling bad about yourself - or that you are a failure or have let yourself or your family down Not at all   Trouble concentrating on things such as school, work, reading, or watching TV Not at all   Moving or speaking so slowly that other people could have noticed; or the opposite being so fidgety that others notice Not at all   Thoughts of being better off dead, or hurting yourself in some way Not at all   PHQ 9 Score 0   How difficult have these problems made it for you to do your work, take care of your home and get along with others Not difficult at all       Learning Assessment:  No flowsheet data found. Abuse Screening:  No flowsheet data found. Fall Risk  Fall Risk Assessment, last 12 mths 4/8/2021   Able to walk? Yes   Fall in past 12 months? 1   Do you feel unsteady? 1   Are you worried about falling 1   Is TUG test greater than 12 seconds? 0   Is the gait abnormal? 1   Number of falls in past 12 months 2   Fall with injury? 0       Health Maintenance reviewed and discussed and ordered per Provider. Health Maintenance Due   Topic Date Due    COVID-19 Vaccine (1) Never done    DTaP/Tdap/Td series (1 - Tdap) Never done    Shingrix Vaccine Age 50> (1 of 2) Never done    Pneumococcal 65+ years (1 of 1 - PPSV23) Never done    Medicare Yearly Exam  Never done   . Coordination of Care:  1.  Have you been to the ER, urgent care clinic since your last visit? Hospitalized since your last visit? no    2. Have you seen or consulted any other health care providers outside of the [address] [place] [address] since your last visit? Include any pap smears or colon screening.  no      Last  Checked no  Last UDS Checked no  Last Pain contract signed: no

## 2021-04-08 NOTE — PROGRESS NOTES
This is the Subsequent Medicare Annual Wellness Exam, performed 12 months or more after the Initial AWV or the last Subsequent AWV    I have reviewed the patient's medical history in detail and updated the computerized patient record. Assessment/Plan   Education and counseling provided:  Are appropriate based on today's review and evaluation    1. Medicare annual wellness visit, subsequent  2. Screening for depression  3. ACP (advance care planning)       Depression Risk Factor Screening:     3 most recent PHQ Screens 4/8/2021   Little interest or pleasure in doing things Not at all   Feeling down, depressed, irritable, or hopeless Not at all   Total Score PHQ 2 0   Trouble falling or staying asleep, or sleeping too much Not at all   Feeling tired or having little energy Not at all   Poor appetite, weight loss, or overeating Not at all   Feeling bad about yourself - or that you are a failure or have let yourself or your family down Not at all   Trouble concentrating on things such as school, work, reading, or watching TV Not at all   Moving or speaking so slowly that other people could have noticed; or the opposite being so fidgety that others notice Not at all   Thoughts of being better off dead, or hurting yourself in some way Not at all   PHQ 9 Score 0   How difficult have these problems made it for you to do your work, take care of your home and get along with others Not difficult at all       Alcohol Risk Screen    Do you average more than 1 drink per night or more than 7 drinks a week:  No    On any one occasion in the past three months have you have had more than 3 drinks containing alcohol:  No        Functional Ability and Level of Safety:    Hearing: Hearing is good. Activities of Daily Living: The home contains: no safety equipment. Patient does total self care      Ambulation: with mild difficulty     Fall Risk:  Fall Risk Assessment, last 12 mths 4/8/2021   Able to walk?  Yes   Fall in past 12 months? 1   Do you feel unsteady? 1   Are you worried about falling 1   Is TUG test greater than 12 seconds? 0   Is the gait abnormal? 1   Number of falls in past 12 months 2   Fall with injury? 0      Abuse Screen:  Patient is not abused       Cognitive Screening    Has your family/caregiver stated any concerns about your memory: no    Cognitive Screening: Normal - MMSE (Mini Mental Status Exam)    Health Maintenance Due     Health Maintenance Due   Topic Date Due    DTaP/Tdap/Td series (1 - Tdap) Never done    Shingrix Vaccine Age 50> (1 of 2) Never done    Pneumococcal 65+ years (1 of 1 - PPSV23) Never done    Medicare Yearly Exam  Never done       Patient Care Team   Patient Care Team:  Mihai Hui NP as PCP - General (Nurse Practitioner)  Tania Buckley NP as PCP - [facility] Provider    History     Patient Active Problem List   Diagnosis Code    Essential hypertension I10    Positive PPD R76.11    Diverticulitis K57.92    Hematuria R31.9    GI bleeding K92.2    COLUMBA (acute kidney injury) ([address] N17.9    AMS (altered mental status) R41.82    Cholelithiases K80.20    Acute congestive heart failure (HCC) I50.9    Syncope R55    Palpitations R00.2    Pneumonia J18.9    Suspected COVID-19 virus infection Z20.822     Past Medical History:   Diagnosis Date    Asthma     COVID-19     Diverticulitis     Hematuria     Hypertension     Positive PPD     Sinusitis     Syncope     Vertigo       Past Surgical History:   Procedure Laterality Date    ENDOSCOPY, COLON, DIAGNOSTIC  01/29/2008    HX GYN      Fibroid Tumorectomy    HX KNEE ARTHROSCOPY  5/26/10    R knee     Current Outpatient Medications   Medication Sig Dispense Refill    aspirin delayed-release 81 mg tablet Take 1 Tab by mouth daily.  100 Tab prn    albuterol (PROVENTIL HFA, VENTOLIN HFA, PROAIR HFA) 90 mcg/actuation inhaler 1 OR 2 PUFFS EVERY 4 HRS AS NEEDED      amLODIPine (NORVASC) 2.5 mg tablet TAKE 1 TABLET BY MOUTH EVERY DAY      ammonium lactate (LAC-HYDRIN) 12 % lotion DIME  SIZED AMOUTN TWICE A DAY TO LEGS 90 DAYS      cloNIDine HCl (CATAPRES) 0.2 mg tablet Take 0.2 mg by mouth two (2) times a day. No Known Allergies    Family History   Problem Relation Age of Onset   Pia Roca Cancer Mother     Hypertension Father     Stroke Father     Cancer Sister      Social History     Tobacco Use    Smoking status: Never Smoker    Smokeless tobacco: Never Used   Substance Use Topics    Alcohol use: No           Grazyna Mckinnon    This is the Subsequent Medicare Annual Wellness Exam, performed 12 months or more after the Initial AWV or the last Subsequent AWV    I have reviewed the patient's medical history in detail and updated the computerized patient record. Assessment/Plan   Education and counseling provided:  Are appropriate based on today's review and evaluation    1. Medicare annual wellness visit, subsequent  2. Screening for depression  3.  ACP (advance care planning)       Depression Risk Factor Screening     3 most recent PHQ Screens 4/8/2021   Little interest or pleasure in doing things Not at all   Feeling down, depressed, irritable, or hopeless Not at all   Total Score PHQ 2 0   Trouble falling or staying asleep, or sleeping too much Not at all   Feeling tired or having little energy Not at all   Poor appetite, weight loss, or overeating Not at all   Feeling bad about yourself - or that you are a failure or have let yourself or your family down Not at all   Trouble concentrating on things such as school, work, reading, or watching TV Not at all   Moving or speaking so slowly that other people could have noticed; or the opposite being so fidgety that others notice Not at all   Thoughts of being better off dead, or hurting yourself in some way Not at all   PHQ 9 Score 0   How difficult have these problems made it for you to do your work, take care of your home and get along with others Not difficult at all       Alcohol Risk Screen    Do you average more than 1 drink per night or more than 7 drinks a week:  No    On any one occasion in the past three months have you have had more than 3 drinks containing alcohol:  No        Functional Ability and Level of Safety    Hearing: Hearing is good. Activities of Daily Living: The home contains: handrails and grab bars  Patient does total self care      Ambulation: with mild difficulty     Fall Risk:  Fall Risk Assessment, last 12 mths 4/8/2021   Able to walk? Yes   Fall in past 12 months? 1   Do you feel unsteady? 1   Are you worried about falling 1   Is TUG test greater than 12 seconds? 0   Is the gait abnormal? 1   Number of falls in past 12 months 2   Fall with injury?  0      Abuse Screen:  Patient is not abused       Cognitive Screening    Has your family/caregiver stated any concerns about your memory: no     Cognitive Screening: Normal - MMSE (Mini Mental Status Exam)    Health Maintenance Due     Health Maintenance Due   Topic Date Due    DTaP/Tdap/Td series (1 - Tdap) Never done    Shingrix Vaccine Age 50> (1 of 2) Never done    Pneumococcal 65+ years (1 of 1 - PPSV23) Never done       Patient Care Team   Patient Care Team:  Mike Hui NP as PCP - General (Nurse Practitioner)  Fidel Graham NP as PCP - [facility] Empaneled Provider    History     Patient Active Problem List   Diagnosis Code    Essential hypertension I10    Positive PPD R76.11    Diverticulitis K57.92    Hematuria R31.9    GI bleeding K92.2    COLUMBA (acute kidney injury) ([facility] [address] N17.9    AMS (altered mental status) R41.82    Cholelithiases K80.20    Acute congestive heart failure ([facility] [address] I50.9    Syncope R55    Palpitations R00.2    Pneumonia J18.9    Suspected COVID-19 virus infection Z20.822     Past Medical History:   Diagnosis Date    Asthma     COVID-19     Diverticulitis     Hematuria     Hypertension     Positive PPD     Sinusitis     Syncope     Vertigo       Past Surgical History:   Procedure Laterality Date    ENDOSCOPY, COLON, DIAGNOSTIC  01/29/2008    HX GYN      Fibroid Tumorectomy    HX KNEE ARTHROSCOPY  5/26/10    R knee     Current Outpatient Medications   Medication Sig Dispense Refill    aspirin delayed-release 81 mg tablet Take 1 Tab by mouth daily. 100 Tab prn    albuterol (PROVENTIL HFA, VENTOLIN HFA, PROAIR HFA) 90 mcg/actuation inhaler 1 OR 2 PUFFS EVERY 4 HRS AS NEEDED      amLODIPine (NORVASC) 2.5 mg tablet TAKE 1 TABLET BY MOUTH EVERY DAY      ammonium lactate (LAC-HYDRIN) 12 % lotion DIME  SIZED AMOUTN TWICE A DAY TO LEGS 90 DAYS      cloNIDine HCl (CATAPRES) 0.2 mg tablet Take 0.2 mg by mouth two (2) times a day.        No Known Allergies    Family History   Problem Relation Age of Onset    Cancer Mother     Hypertension Father     Stroke Father     Cancer Sister      Social History     Tobacco Use    Smoking status: Never Smoker    Smokeless tobacco: Never Used   Substance Use Topics    Alcohol use: No         Grazyna Mckinnon

## 2021-04-13 DIAGNOSIS — I50.9 ACUTE CONGESTIVE HEART FAILURE, UNSPECIFIED HEART FAILURE TYPE (HCC): ICD-10-CM

## 2021-04-13 RX ORDER — HYDROCHLOROTHIAZIDE 25 MG/1
TABLET ORAL
Qty: 30 TAB | Refills: 1 | Status: SHIPPED | OUTPATIENT
Start: 2021-04-13 | End: 2021-05-11

## 2021-04-20 DIAGNOSIS — E87.6 HYPOKALEMIA: Primary | ICD-10-CM

## 2021-04-20 RX ORDER — POTASSIUM CHLORIDE 750 MG/1
10 TABLET, EXTENDED RELEASE ORAL DAILY
Qty: 90 TAB | Refills: 0 | Status: SHIPPED | OUTPATIENT
Start: 2021-04-20 | End: 2021-07-19

## 2021-04-21 NOTE — PROGRESS NOTES
These notify the patient of the following labs    Please inform patient that her potassium level was 3.4 which is slightly low normal potassium level is between 3.5 and 5.0. Inform patient that I will prescribe potassium for her to take to increase her levels. Also inform patient that her glomerular filtration rate was 57 which is slightly low and normal GFR is 60 however lab does decrease slightly as we age. Due to the findings of acute kidney injury in the emergency room I have referred patient to nephrology in February. Please check with patient to see if she have started seeing nephrology if not please give her the information in the referral it was with Dr. Crys Pierre. Inform patient also that her cholesterol level was just slightly elevated total cholesterol was 201 it should be less than 200 triglyceride cholesterol was 151 should be less than 150. Numbers are not high enough at this time to start any medications I would suggest patient to incorporate healthier lifestyle changes with diet and exercise.

## 2021-04-26 ENCOUNTER — TELEPHONE (OUTPATIENT)
Dept: FAMILY MEDICINE CLINIC | Age: 76
End: 2021-04-26

## 2021-04-26 NOTE — TELEPHONE ENCOUNTER
Pt is concerned of the medication that was sent to the pharmacy 04/20 (potassium chloride) stated she doesn't know why it was sent to the pharmacy, Ms Diomedes Saldana is going to hold off on taking this medication until she hears back from Dr Yasmin Boone or her nurse.  Please follow up when available

## 2021-04-27 NOTE — TELEPHONE ENCOUNTER
Spoke with patient and advised that she was on HCTZ through cardiology and she has a history or low potassium-patient verbalized understanding

## 2021-04-28 NOTE — PROGRESS NOTES
After obtaining identifiers patient was made aware of all lab results, recommendations and referral information.   Patient verba;izshell understanding

## 2021-05-10 ENCOUNTER — OFFICE VISIT (OUTPATIENT)
Dept: CARDIOLOGY CLINIC | Age: 76
End: 2021-05-10
Payer: MEDICARE

## 2021-05-10 VITALS
SYSTOLIC BLOOD PRESSURE: 137 MMHG | DIASTOLIC BLOOD PRESSURE: 84 MMHG | WEIGHT: 213.6 LBS | HEIGHT: 64 IN | OXYGEN SATURATION: 96 % | BODY MASS INDEX: 36.47 KG/M2 | HEART RATE: 85 BPM

## 2021-05-10 DIAGNOSIS — I10 ESSENTIAL HYPERTENSION: ICD-10-CM

## 2021-05-10 DIAGNOSIS — R55 SYNCOPE, UNSPECIFIED SYNCOPE TYPE: ICD-10-CM

## 2021-05-10 DIAGNOSIS — E66.01 SEVERE OBESITY (BMI 35.0-39.9) WITH COMORBIDITY (HCC): ICD-10-CM

## 2021-05-10 DIAGNOSIS — I50.9 ACUTE CONGESTIVE HEART FAILURE, UNSPECIFIED HEART FAILURE TYPE (HCC): Primary | ICD-10-CM

## 2021-05-10 PROCEDURE — G8536 NO DOC ELDER MAL SCRN: HCPCS | Performed by: INTERNAL MEDICINE

## 2021-05-10 PROCEDURE — G8752 SYS BP LESS 140: HCPCS | Performed by: INTERNAL MEDICINE

## 2021-05-10 PROCEDURE — G8432 DEP SCR NOT DOC, RNG: HCPCS | Performed by: INTERNAL MEDICINE

## 2021-05-10 PROCEDURE — G8754 DIAS BP LESS 90: HCPCS | Performed by: INTERNAL MEDICINE

## 2021-05-10 PROCEDURE — 99214 OFFICE O/P EST MOD 30 MIN: CPT | Performed by: INTERNAL MEDICINE

## 2021-05-10 PROCEDURE — G8417 CALC BMI ABV UP PARAM F/U: HCPCS | Performed by: INTERNAL MEDICINE

## 2021-05-10 PROCEDURE — G8427 DOCREV CUR MEDS BY ELIG CLIN: HCPCS | Performed by: INTERNAL MEDICINE

## 2021-05-10 PROCEDURE — G8399 PT W/DXA RESULTS DOCUMENT: HCPCS | Performed by: INTERNAL MEDICINE

## 2021-05-10 PROCEDURE — 1090F PRES/ABSN URINE INCON ASSESS: CPT | Performed by: INTERNAL MEDICINE

## 2021-05-10 PROCEDURE — 3017F COLORECTAL CA SCREEN DOC REV: CPT | Performed by: INTERNAL MEDICINE

## 2021-05-10 PROCEDURE — 1101F PT FALLS ASSESS-DOCD LE1/YR: CPT | Performed by: INTERNAL MEDICINE

## 2021-05-10 RX ORDER — SPIRONOLACTONE 25 MG/1
25 TABLET ORAL DAILY
Qty: 30 TAB | Refills: 1 | Status: SHIPPED | OUTPATIENT
Start: 2021-05-10 | End: 2021-06-02

## 2021-05-10 NOTE — PROGRESS NOTES
HISTORY OF PRESENT ILLNESS  Benny Moreno is a 76 y.o. female. 3/19/2021 seen for hypertension but also has other symptoms as noted below. Episode of syncope a couple of weeks ago in the tub when she fell while taking a shower. She thinks she lost her consciousness completely. Denies any aura or dizziness prior to that. Did not have any palpitations prior to that. Upon waking up, she felt completely normal immediately. Hypertension  The history is provided by the medical records. This is a chronic problem. Associated symptoms include shortness of breath. Pertinent negatives include no chest pain and no headaches. Shortness of Breath  The history is provided by the patient. This is a new problem. The problem occurs intermittently. The current episode started more than 1 week ago. Associated symptoms include leg swelling. Pertinent negatives include no fever, no headaches, no cough, no wheezing, no PND, no orthopnea, no chest pain, no vomiting, no rash and no claudication. The problem's precipitants include exercise (house chores). Leg Swelling  The history is provided by the patient. This is a new problem. The current episode started more than 1 week ago (yrs). The problem occurs daily. Associated symptoms include shortness of breath. Pertinent negatives include no chest pain and no headaches. The symptoms are aggravated by standing. Palpitations   The history is provided by the patient. This is a new problem. The current episode started more than 1 week ago (2/20). The problem occurs daily. The problem is associated with nothing. Associated symptoms include lower extremity edema and shortness of breath. Pertinent negatives include no fever, no malaise/fatigue, no chest pain, no claudication, no orthopnea, no PND, no nausea, no vomiting, no headaches, no dizziness and no cough. Her past medical history is significant for hypertension.      No Known Allergies    Past Medical History:   Diagnosis Date  Asthma     COVID-19     Diverticulitis     Hematuria     Hypertension     Positive PPD     Sinusitis     Syncope     Vertigo        Family History   Problem Relation Age of Onset    Cancer Mother     Hypertension Father     Stroke Father     Cancer Sister        Social History     Tobacco Use    Smoking status: Never Smoker    Smokeless tobacco: Never Used   Substance Use Topics    Alcohol use: No    Drug use: Never        Current Outpatient Medications   Medication Sig    potassium chloride (KLOR-CON) 10 mEq tablet Take 1 Tab by mouth daily.  hydroCHLOROthiazide (HYDRODIURIL) 25 mg tablet TAKE 1 TABLET BY MOUTH EVERY DAY    albuterol (PROVENTIL HFA, VENTOLIN HFA, PROAIR HFA) 90 mcg/actuation inhaler 1 OR 2 PUFFS EVERY 4 HRS AS NEEDED    amLODIPine (NORVASC) 2.5 mg tablet TAKE 1 TABLET BY MOUTH EVERY DAY    ammonium lactate (LAC-HYDRIN) 12 % lotion DIME  SIZED AMOUTN TWICE A DAY TO LEGS 90 DAYS    cloNIDine HCl (CATAPRES) 0.2 mg tablet Take 0.2 mg by mouth two (2) times a day. No current facility-administered medications for this visit. Past Surgical History:   Procedure Laterality Date    ENDOSCOPY, COLON, DIAGNOSTIC  01/29/2008    HX GYN      Fibroid Tumorectomy    HX KNEE ARTHROSCOPY  5/26/10    R knee       Visit Vitals  /84 (BP 1 Location: Right arm, BP Patient Position: Sitting, BP Cuff Size: Adult)   Pulse 85   Ht 5' 4\" (1.626 m)   Wt 96.9 kg (213 lb 9.6 oz)   SpO2 96%   BMI 36.66 kg/m²       Diagnostic Studies:  I have reviewed the relevant tests done on the patient and show as follows  EKG tracings reviewed by me today. EKG Results     None        XR Results (most recent):  Results from Hospital Encounter encounter on 06/11/15   XR CHEST PA LAT    Narrative PA and lateral Chest    Indication:  Shortness of breath    Comparison:  May 14 2010    Findings:       The cardiomediastinal silhouette is normal.  The lungs are clear showing no  masses, infiltrates or effusions. There is no vascular congestion. Thoracic  spondylosis is evident. Impression IMPRESSION:    No active disease       Ms. Carmen Triana has a reminder for a \"due or due soon\" health maintenance. I have asked that she contact her primary care provider for follow-up on this health maintenance. Review of Systems   Constitutional: Negative for chills, fever, malaise/fatigue and weight loss. HENT: Negative for nosebleeds. Eyes: Negative for discharge. Respiratory: Positive for shortness of breath. Negative for cough and wheezing. Cardiovascular: Positive for palpitations and leg swelling. Negative for chest pain, orthopnea, claudication and PND. Gastrointestinal: Negative for diarrhea, nausea and vomiting. Genitourinary: Negative for dysuria and hematuria. Musculoskeletal: Negative for joint pain. Skin: Negative for rash. Neurological: Negative for dizziness, seizures, loss of consciousness and headaches. Endo/Heme/Allergies: Negative for polydipsia. Does not bruise/bleed easily. Psychiatric/Behavioral: Negative for depression and substance abuse. The patient does not have insomnia.      03/25/21   ECHO ADULT COMPLETE 03/25/2021 3/25/2021    Narrative · LV: Estimated LVEF is 65 - 70%. Visually measured ejection fraction. Normal cavity size and systolic function (ejection fraction normal). Upper   normal wall thickness. Wall motion: normal. Mild (grade 1) left   ventricular diastolic dysfunction. · PA: Pulmonary arterial systolic pressure is 25 mmHg. · Mild hypertrophy of the septum. Signed by: Misbah Greene MD       Physical Exam   Constitutional: She is oriented to person, place, and time. She appears well-developed and well-nourished. No distress. Sev obese   HENT:   Head: Normocephalic and atraumatic. Mouth/Throat: Normal dentition. Eyes: Right eye exhibits no discharge. Left eye exhibits no discharge. No scleral icterus. Neck: Neck supple.  Hepatojugular reflux and JVD present. Carotid bruit is not present. No thyromegaly present. Cardiovascular: Normal rate, regular rhythm, S1 normal, S2 normal, normal heart sounds and intact distal pulses. Exam reveals no gallop and no friction rub. No murmur heard. Pulmonary/Chest: Effort normal and breath sounds normal. She has no wheezes. She has no rales. Abdominal: Soft. She exhibits no mass. There is no abdominal tenderness. Musculoskeletal:         General: Edema (2-3+) present. Lymphadenopathy:        Right cervical: No superficial cervical adenopathy present. Left cervical: No superficial cervical adenopathy present. Neurological: She is alert and oriented to person, place, and time. Skin: Skin is warm and dry. No rash noted. Psychiatric: She has a normal mood and affect. Her behavior is normal.       ASSESSMENT and PLAN    LIPIDS : Results for Javier Smith (MRN [id number]) as of 5/10/2021 09:05   Ref. Range 4/1/2021 09:09 4/1/2021 09:09   Triglyceride Latest Ref Range: <150 MG/ (H) 151 (H)   Cholesterol, total Latest Ref Range: <200 MG/ (H) 201 (H)   HDL Cholesterol Latest Ref Range: 40 - 60 MG/DL 73 (H) 73 (H)   CHOL/HDL Ratio Latest Ref Range: 0 - 5.0   2.9 2.8   VLDL, calculated Latest Units: MG/DL 31.2 30.2   LDL, calculated Latest Ref Range: 0 - 100 MG/.8 (H) 97.8     3/19/2021   Has acute CHF of unclear etiology. Check echo. As CHF improves will need CAD work-up also  Had syncope and is undergoing neurology work-up but will get a mobile telemetry. Increase HCTZ for hypertension and CHF. Follow-up labs in 2 weeks also. She is trying to lose weight. Mediterranean diet guidelines printed. Diagnoses and all orders for this visit:    1. Acute congestive heart failure, unspecified heart failure type (HCC)  -     NUCLEAR CARDIAC STRESS TEST; Future  -     spironolactone (ALDACTONE) 25 mg tablet; Take 1 Tab by mouth daily.  -     METABOLIC PANEL, BASIC; Future    2. Essential hypertension    3. Syncope, unspecified syncope type  Comments:  h/o fall in 2/21- no recurrence till 5/21    4. Severe obesity (BMI 35.0-39. 9) with comorbidity [facility])        Pertinent laboratory and test data reviewed and discussed with patient. See patient instructions also for other medical advice given    Medications Discontinued During This Encounter   Medication Reason    aspirin delayed-release 81 mg tablet Side Effects       Follow-up and Dispositions    · Return in about 2 weeks (around 5/24/2021), or if symptoms worsen or fail to improve, for same day post test.       5/10/2021 CHF is persisting. Echo has shown normal ejection fraction mild LVH. Check a stress test to evaluate ischemia. Add Aldactone and follow-up electrolytes closely. She did not get mobile telemetry equipment and has not had any episodes or dizziness in the last 6 weeks. Will wait on the telemetry for now. Blood pressure is mildly elevated and will hopefully reduce as we give more aggressive diuresis. Discussed diet in detail specially sodium intake.

## 2021-05-10 NOTE — PROGRESS NOTES
1. Have you been to the ER, urgent care clinic since your last visit? Hospitalized since your last visit? Yes     When:  Where: [facility] Reason for visit: blood in stool    2. Have you seen or consulted any other health care providers outside of the [address] [place] [address] since your last visit? Include any pap smears or colon screening. No     3. Since your last visit, have you had any of the following symptoms? Yes      swelling in legs/arms. Explain:swelling bilateral ankles down to feet     4. Have you had any blood work, X-rays or cardiac testing? Yes     Requested: YES     In [facility]: YES    5. Where do you normally have your labs drawn? PCP Office       6. Do you need any refills today?    No

## 2021-05-10 NOTE — PATIENT INSTRUCTIONS
Medications Discontinued During This Encounter Medication Reason  aspirin delayed-release 81 mg tablet Side Effects After the recommended changes have been made in blood pressure medicines, patient advised to keep BP/HR(pulse rate) chart twice daily and bring us results in next 4 to 5 days. Patient may send the results via \"My Chart\" if desired. Please rest for 5-10 minutes before checking blood pressure. Sit on a comfortable chair without crossing the legs and put your arm on a table. We recommend that you use an upper arm cuff. Check the blood pressure 3 times each time you check the blood pressure and record the lowest reading. If you check the blood pressure in both arms, use the higher reading. Avoiding Triggers With Heart Failure: Care Instructions Your Care Instructions Triggers are anything that make your heart failure flare up. A flare-up is also called \"sudden heart failure\" or \"acute heart failure. \" When you have a flare-up, fluid builds up in your lungs, and you have problems breathing. You might need to go to the hospital. By watching for changes in your condition and avoiding triggers, you can prevent heart failure flare-ups. Follow-up care is a key part of your treatment and safety. Be sure to make and go to all appointments, and call your doctor if you are having problems. It's also a good idea to know your test results and keep a list of the medicines you take. How can you care for yourself at home? Watch for changes in your weight and condition · Weigh yourself without clothing at the same time each day. Record your weight. Call your doctor if you have sudden weight gain, such as more than 2 to 3 pounds in a day or 5 pounds in a week. (Your doctor may suggest a different range of weight gain.) A sudden weight gain may mean that your heart failure is getting worse. · Keep a daily record of your symptoms.  Write down any changes in how you feel, such as new shortness of breath, cough, or problems eating. Also record if your ankles are more swollen than usual and if you feel more tired than usual. Note anything that you ate or did that could have triggered these changes. Limit sodium Sodium causes your body to hold on to extra water. This may cause your heart failure symptoms to get worse. People get most of their sodium from processed foods. Fast food and restaurant meals also tend to be very high in sodium. · Your doctor may suggest that you limit sodium. Your doctor can tell you how much sodium is right for you. This includes limiting sodium in cooked and packaged foods. · Read food labels on cans and food packages. They tell you how much sodium you get in one serving. Check the serving size. If you eat more than one serving, you are getting more sodium. · Be aware that sodium can come in forms other than salt, including monosodium glutamate (MSG), sodium citrate, and sodium bicarbonate (baking soda). MSG is often added to Asian food. You can sometimes ask for food without MSG or salt. · Slowly reducing salt will help you adjust to the taste. Take the salt shaker off the table. · Flavor your food with garlic, lemon juice, onion, vinegar, herbs, and spices instead of salt. Do not use soy sauce, steak sauce, onion salt, garlic salt, mustard, or ketchup on your food, unless it is labeled \"low-sodium\" or \"low-salt. \" 
· Make your own salad dressings, sauces, and ketchup without adding salt. · Use fresh or frozen ingredients, instead of canned ones, whenever you can. Choose low-sodium canned goods. · Eat less processed food and food from restaurants, including fast food. Exercise as directed Moderate, regular exercise is very good for your heart. It improves your blood flow and helps control your weight. But too much exercise can stress your heart and cause a heart failure flare-up.  
· Check with your doctor before you start an exercise program. 
· Walking is an easy way to get exercise. Start out slowly. Gradually increase the length and pace of your walk. Swimming, riding a bike, and using a treadmill are also good forms of exercise. · When you exercise, watch for signs that your heart is working too hard. You are pushing yourself too hard if you cannot talk while you are exercising. If you become short of breath or dizzy or have chest pain, stop, sit down, and rest. 
· Do not exercise when you do not feel well. Take medicines correctly · Take your medicines exactly as prescribed. Call your doctor if you think you are having a problem with your medicine. · Make a list of all the medicines you take. Include those prescribed to you by other doctors and any over-the-counter medicines, vitamins, or supplements you take. Take this list with you when you go to any doctor. · Take your medicines at the same time every day. It may help you to post a list of all the medicines you take every day and what time of day you take them. · Make taking your medicine as simple as you can. Plan times to take your medicines when you are doing other things, such as eating a meal or getting ready for bed. This will make it easier to remember to take your medicines. · Get organized. Use helpful tools, such as daily or weekly pill containers. When should you call for help? Call 911 if you have symptoms of sudden heart failure such as: 
  · You have severe trouble breathing.  
  · You cough up pink, foamy mucus.  
  · You have a new irregular or rapid heartbeat. Call your doctor now or seek immediate medical care if: 
  · You have new or increased shortness of breath.  
  · You are dizzy or lightheaded, or you feel like you may faint.  
  · You have sudden weight gain, such as more than 2 to 3 pounds in a day or 5 pounds in a week.  (Your doctor may suggest a different range of weight gain.)  
  · You have increased swelling in your legs, ankles, or feet.  
  · You are suddenly so tired or weak that you cannot do your usual activities. Watch closely for changes in your health, and be sure to contact your doctor if you develop new symptoms. Where can you learn more? Go to http://www.gray.com/ Enter U986 in the search box to learn more about \"Avoiding Triggers With Heart Failure: Care Instructions. \" Current as of: August 31, 2020               Content Version: 12.8 © 2006-2021 [employer]. Care instructions adapted under license by [employer] (which disclaims liability or warranty for this information). If you have questions about a medical condition or this instruction, always ask your healthcare professional. [address] any warranty or liability for your use of this information.

## 2021-05-11 DIAGNOSIS — I50.9 ACUTE CONGESTIVE HEART FAILURE, UNSPECIFIED HEART FAILURE TYPE (HCC): ICD-10-CM

## 2021-05-11 RX ORDER — HYDROCHLOROTHIAZIDE 25 MG/1
TABLET ORAL
Qty: 30 TAB | Refills: 1 | Status: SHIPPED | OUTPATIENT
Start: 2021-05-11 | End: 2021-06-04

## 2021-05-13 ENCOUNTER — OFFICE VISIT (OUTPATIENT)
Dept: CARDIOLOGY CLINIC | Age: 76
End: 2021-05-13

## 2021-05-13 VITALS
WEIGHT: 209 LBS | HEART RATE: 57 BPM | SYSTOLIC BLOOD PRESSURE: 150 MMHG | BODY MASS INDEX: 35.68 KG/M2 | DIASTOLIC BLOOD PRESSURE: 90 MMHG | HEIGHT: 64 IN

## 2021-05-13 DIAGNOSIS — I10 ESSENTIAL HYPERTENSION: ICD-10-CM

## 2021-05-13 DIAGNOSIS — I50.32 CHRONIC DIASTOLIC CONGESTIVE HEART FAILURE (HCC): Primary | ICD-10-CM

## 2021-05-13 DIAGNOSIS — E66.01 SEVERE OBESITY (BMI 35.0-39.9) WITH COMORBIDITY (HCC): ICD-10-CM

## 2021-05-13 PROCEDURE — 3017F COLORECTAL CA SCREEN DOC REV: CPT | Performed by: INTERNAL MEDICINE

## 2021-05-13 PROCEDURE — 1090F PRES/ABSN URINE INCON ASSESS: CPT | Performed by: INTERNAL MEDICINE

## 2021-05-13 PROCEDURE — G8427 DOCREV CUR MEDS BY ELIG CLIN: HCPCS | Performed by: INTERNAL MEDICINE

## 2021-05-13 PROCEDURE — G8417 CALC BMI ABV UP PARAM F/U: HCPCS | Performed by: INTERNAL MEDICINE

## 2021-05-13 PROCEDURE — G8755 DIAS BP > OR = 90: HCPCS | Performed by: INTERNAL MEDICINE

## 2021-05-13 PROCEDURE — G8753 SYS BP > OR = 140: HCPCS | Performed by: INTERNAL MEDICINE

## 2021-05-13 PROCEDURE — G8399 PT W/DXA RESULTS DOCUMENT: HCPCS | Performed by: INTERNAL MEDICINE

## 2021-05-13 PROCEDURE — 1101F PT FALLS ASSESS-DOCD LE1/YR: CPT | Performed by: INTERNAL MEDICINE

## 2021-05-13 PROCEDURE — 99213 OFFICE O/P EST LOW 20 MIN: CPT | Performed by: INTERNAL MEDICINE

## 2021-05-13 PROCEDURE — G8432 DEP SCR NOT DOC, RNG: HCPCS | Performed by: INTERNAL MEDICINE

## 2021-05-13 PROCEDURE — G8536 NO DOC ELDER MAL SCRN: HCPCS | Performed by: INTERNAL MEDICINE

## 2021-05-13 NOTE — PATIENT INSTRUCTIONS
There are no discontinued medications. After the recommended changes have been made in blood pressure medicines, patient advised to keep BP/HR(pulse rate) chart twice daily and bring us results in next 4 to 5 days. Patient may send the results via \"My Chart\" if desired. Please rest for 5-10 minutes before checking blood pressure. Sit on a comfortable chair without crossing the legs and put your arm on a table. We recommend that you use an upper arm cuff. Check the blood pressure 3 times each time you check the blood pressure and record the lowest reading. If you check the blood pressure in both arms, use the higher reading. Avoiding Triggers With Heart Failure: Care Instructions Your Care Instructions Triggers are anything that make your heart failure flare up. A flare-up is also called \"sudden heart failure\" or \"acute heart failure. \" When you have a flare-up, fluid builds up in your lungs, and you have problems breathing. You might need to go to the hospital. By watching for changes in your condition and avoiding triggers, you can prevent heart failure flare-ups. Follow-up care is a key part of your treatment and safety. Be sure to make and go to all appointments, and call your doctor if you are having problems. It's also a good idea to know your test results and keep a list of the medicines you take. How can you care for yourself at home? Watch for changes in your weight and condition · Weigh yourself without clothing at the same time each day. Record your weight. Call your doctor if you have sudden weight gain, such as more than 2 to 3 pounds in a day or 5 pounds in a week. (Your doctor may suggest a different range of weight gain.) A sudden weight gain may mean that your heart failure is getting worse. · Keep a daily record of your symptoms. Write down any changes in how you feel, such as new shortness of breath, cough, or problems eating.  Also record if your ankles are more swollen than usual and if you feel more tired than usual. Note anything that you ate or did that could have triggered these changes. Limit sodium Sodium causes your body to hold on to extra water. This may cause your heart failure symptoms to get worse. People get most of their sodium from processed foods. Fast food and restaurant meals also tend to be very high in sodium. · Your doctor may suggest that you limit sodium. Your doctor can tell you how much sodium is right for you. This includes limiting sodium in cooked and packaged foods. · Read food labels on cans and food packages. They tell you how much sodium you get in one serving. Check the serving size. If you eat more than one serving, you are getting more sodium. · Be aware that sodium can come in forms other than salt, including monosodium glutamate (MSG), sodium citrate, and sodium bicarbonate (baking soda). MSG is often added to Asian food. You can sometimes ask for food without MSG or salt. · Slowly reducing salt will help you adjust to the taste. Take the salt shaker off the table. · Flavor your food with garlic, lemon juice, onion, vinegar, herbs, and spices instead of salt. Do not use soy sauce, steak sauce, onion salt, garlic salt, mustard, or ketchup on your food, unless it is labeled \"low-sodium\" or \"low-salt. \" 
· Make your own salad dressings, sauces, and ketchup without adding salt. · Use fresh or frozen ingredients, instead of canned ones, whenever you can. Choose low-sodium canned goods. · Eat less processed food and food from restaurants, including fast food. Exercise as directed Moderate, regular exercise is very good for your heart. It improves your blood flow and helps control your weight. But too much exercise can stress your heart and cause a heart failure flare-up. · Check with your doctor before you start an exercise program. 
· Walking is an easy way to get exercise. Start out slowly. Gradually increase the length and pace of your walk. Swimming, riding a bike, and using a treadmill are also good forms of exercise. · When you exercise, watch for signs that your heart is working too hard. You are pushing yourself too hard if you cannot talk while you are exercising. If you become short of breath or dizzy or have chest pain, stop, sit down, and rest. 
· Do not exercise when you do not feel well. Take medicines correctly · Take your medicines exactly as prescribed. Call your doctor if you think you are having a problem with your medicine. · Make a list of all the medicines you take. Include those prescribed to you by other doctors and any over-the-counter medicines, vitamins, or supplements you take. Take this list with you when you go to any doctor. · Take your medicines at the same time every day. It may help you to post a list of all the medicines you take every day and what time of day you take them. · Make taking your medicine as simple as you can. Plan times to take your medicines when you are doing other things, such as eating a meal or getting ready for bed. This will make it easier to remember to take your medicines. · Get organized. Use helpful tools, such as daily or weekly pill containers. When should you call for help? Call 911 if you have symptoms of sudden heart failure such as: 
  · You have severe trouble breathing.  
  · You cough up pink, foamy mucus.  
  · You have a new irregular or rapid heartbeat. Call your doctor now or seek immediate medical care if: 
  · You have new or increased shortness of breath.  
  · You are dizzy or lightheaded, or you feel like you may faint.  
  · You have sudden weight gain, such as more than 2 to 3 pounds in a day or 5 pounds in a week. (Your doctor may suggest a different range of weight gain.)  
  · You have increased swelling in your legs, ankles, or feet.  
  · You are suddenly so tired or weak that you cannot do your usual activities.   
Watch closely for changes in your health, and be sure to contact your doctor if you develop new symptoms. Where can you learn more? Go to http://www.gray.com/ Enter O239 in the search box to learn more about \"Avoiding Triggers With Heart Failure: Care Instructions. \" Current as of: August 31, 2020               Content Version: 12.8 © 6325-1815 [employer]. Care instructions adapted under license by [employer] (which disclaims liability or warranty for this information). If you have questions about a medical condition or this instruction, always ask your healthcare professional. [address] any warranty or liability for your use of this information.

## 2021-05-13 NOTE — PROGRESS NOTES
HISTORY OF PRESENT ILLNESS  Rufina Marie is a 76 y.o. female. 3/19/2021 seen for hypertension but also has other symptoms as noted below. Episode of syncope a couple of weeks ago in the tub when she fell while taking a shower. She thinks she lost her consciousness completely. Denies any aura or dizziness prior to that. Did not have any palpitations prior to that. Upon waking up, she felt completely normal immediately. Hypertension  The history is provided by the medical records. This is a chronic problem. Associated symptoms include shortness of breath. Pertinent negatives include no chest pain and no headaches. Palpitations   The history is provided by the patient. This is a new problem. The current episode started more than 1 week ago (2/20). The problem occurs daily. The problem is associated with nothing. Associated symptoms include lower extremity edema and shortness of breath. Pertinent negatives include no fever, no malaise/fatigue, no chest pain, no claudication, no orthopnea, no PND, no nausea, no vomiting, no headaches, no dizziness and no cough. Her past medical history is significant for hypertension. Shortness of Breath  The history is provided by the patient. This is a new problem. The problem occurs intermittently. The current episode started more than 1 week ago. The problem has been gradually improving. Associated symptoms include leg swelling. Pertinent negatives include no fever, no headaches, no cough, no wheezing, no PND, no orthopnea, no chest pain, no vomiting, no rash and no claudication. The problem's precipitants include exercise (house chores). Leg Swelling  The history is provided by the patient. This is a new problem. The current episode started more than 1 week ago (yrs). The problem occurs daily. The problem has been gradually improving. Associated symptoms include shortness of breath. Pertinent negatives include no chest pain and no headaches.  The symptoms are aggravated by standing. No Known Allergies    Past Medical History:   Diagnosis Date    Asthma     COVID-19     Diverticulitis     Hematuria     Hypertension     Positive PPD     Sinusitis     Syncope     Vertigo        Family History   Problem Relation Age of Onset    Cancer Mother     Hypertension Father     Stroke Father     Cancer Sister        Social History     Tobacco Use    Smoking status: Never Smoker    Smokeless tobacco: Never Used   Substance Use Topics    Alcohol use: No    Drug use: Never        Current Outpatient Medications   Medication Sig    hydroCHLOROthiazide (HYDRODIURIL) 25 mg tablet TAKE 1 TABLET BY MOUTH EVERY DAY    spironolactone (ALDACTONE) 25 mg tablet Take 1 Tab by mouth daily.  potassium chloride (KLOR-CON) 10 mEq tablet Take 1 Tab by mouth daily.  albuterol (PROVENTIL HFA, VENTOLIN HFA, PROAIR HFA) 90 mcg/actuation inhaler 1 OR 2 PUFFS EVERY 4 HRS AS NEEDED    amLODIPine (NORVASC) 2.5 mg tablet TAKE 1 TABLET BY MOUTH EVERY DAY    ammonium lactate (LAC-HYDRIN) 12 % lotion DIME  SIZED AMOUTN TWICE A DAY TO LEGS 90 DAYS    cloNIDine HCl (CATAPRES) 0.2 mg tablet Take 0.2 mg by mouth two (2) times a day. No current facility-administered medications for this visit. Past Surgical History:   Procedure Laterality Date    ENDOSCOPY, COLON, DIAGNOSTIC  01/29/2008    HX GYN      Fibroid Tumorectomy    HX KNEE ARTHROSCOPY  5/26/10    R knee       Visit Vitals  BP (!) 150/90 (BP 1 Location: Left upper arm, BP Patient Position: Sitting, BP Cuff Size: Large adult)   Pulse (!) 57   Ht 5' 4\" (1.626 m)   Wt 94.8 kg (209 lb)   BMI 35.87 kg/m²       Diagnostic Studies:  I have reviewed the relevant tests done on the patient and show as follows  EKG tracings reviewed by me today.     EKG Results     None        XR Results (most recent):  Results from Hospital Encounter encounter on 06/11/15   XR CHEST PA LAT    Narrative PA and lateral Chest    Indication: Shortness of breath    Comparison:  May 14 2010    Findings: The cardiomediastinal silhouette is normal.  The lungs are clear showing no  masses, infiltrates or effusions. There is no vascular congestion. Thoracic  spondylosis is evident. Impression IMPRESSION:    No active disease       Ms. Yu Hughes has a reminder for a \"due or due soon\" health maintenance. I have asked that she contact her primary care provider for follow-up on this health maintenance. Review of Systems   Constitutional: Negative for chills, fever, malaise/fatigue and weight loss. HENT: Negative for nosebleeds. Eyes: Negative for discharge. Respiratory: Positive for shortness of breath. Negative for cough and wheezing. Cardiovascular: Positive for palpitations and leg swelling. Negative for chest pain, orthopnea, claudication and PND. Gastrointestinal: Negative for diarrhea, nausea and vomiting. Genitourinary: Negative for dysuria and hematuria. Musculoskeletal: Negative for joint pain. Skin: Negative for rash. Neurological: Negative for dizziness, seizures, loss of consciousness and headaches. Endo/Heme/Allergies: Negative for polydipsia. Does not bruise/bleed easily. Psychiatric/Behavioral: Negative for depression and substance abuse. The patient does not have insomnia.      03/25/21   ECHO ADULT COMPLETE 03/25/2021 3/25/2021    Narrative · LV: Estimated LVEF is 65 - 70%. Visually measured ejection fraction. Normal cavity size and systolic function (ejection fraction normal). Upper   normal wall thickness. Wall motion: normal. Mild (grade 1) left   ventricular diastolic dysfunction. · PA: Pulmonary arterial systolic pressure is 25 mmHg. · Mild hypertrophy of the septum. Signed by: Nadeem Ackerman MD     05/13/21   NUCLEAR CARDIAC STRESS TEST 05/13/2021 5/13/2021    Narrative · Baseline ECG: Sinus bradycardia.   · Stress test: Negative stress test.  · SPECT: Left ventricular function post-stress was normal. Calculated   ejection fraction is 90%. There is no evidence of transient ischemic   dilation (TID). The TID ratio is 0.98.  · SPECT: Left ventricular perfusion is normal. Myocardial perfusion   imaging supports a low risk stress test.        Signed by: Luis Ken MD     Physical Exam   Constitutional: She is oriented to person, place, and time. She appears well-developed and well-nourished. No distress. Sev obese   HENT:   Head: Normocephalic and atraumatic. Mouth/Throat: Normal dentition. Eyes: Right eye exhibits no discharge. Left eye exhibits no discharge. No scleral icterus. Neck: Neck supple. Hepatojugular reflux and JVD present. Carotid bruit is not present. No thyromegaly present. Cardiovascular: Normal rate, regular rhythm, S1 normal, S2 normal, normal heart sounds and intact distal pulses. Exam reveals no gallop and no friction rub. No murmur heard. Pulmonary/Chest: Effort normal and breath sounds normal. She has no wheezes. She has no rales. Abdominal: Soft. She exhibits no mass. There is no abdominal tenderness. Musculoskeletal:         General: Edema (2-3+) present. Lymphadenopathy:        Right cervical: No superficial cervical adenopathy present. Left cervical: No superficial cervical adenopathy present. Neurological: She is alert and oriented to person, place, and time. Skin: Skin is warm and dry. No rash noted. Psychiatric: She has a normal mood and affect. Her behavior is normal.       ASSESSMENT and PLAN    LIPIDS : Results for Rosana Shone (MRN [id number]) as of 5/10/2021 09:05   Ref.  Range 4/1/2021 09:09 4/1/2021 09:09   Triglyceride Latest Ref Range: <150 MG/ (H) 151 (H)   Cholesterol, total Latest Ref Range: <200 MG/ (H) 201 (H)   HDL Cholesterol Latest Ref Range: 40 - 60 MG/DL 73 (H) 73 (H)   CHOL/HDL Ratio Latest Ref Range: 0 - 5.0   2.9 2.8   VLDL, calculated Latest Units: MG/DL 31.2 30.2   LDL, calculated Latest Ref Range: 0 - 100 MG/.8 (H) 97.8     3/19/2021   Has acute CHF of unclear etiology. Check echo. As CHF improves will need CAD work-up also  Had syncope and is undergoing neurology work-up but will get a mobile telemetry. Increase HCTZ for hypertension and CHF. Follow-up labs in 2 weeks also. She is trying to lose weight. Mediterranean diet guidelines printed. 5/10/2021 CHF is persisting. Echo has shown normal ejection fraction mild LVH. Check a stress test to evaluate ischemia. Add Aldactone and follow-up electrolytes closely. She did not get mobile telemetry equipment and has not had any episodes or dizziness in the last 6 weeks. Will wait on the telemetry for now. Blood pressure is mildly elevated and will hopefully reduce as we give more aggressive diuresis. Discussed diet in detail specially sodium intake. Diagnoses and all orders for this visit:    1. Chronic diastolic congestive heart failure ([address]    2. Essential hypertension    3. Severe obesity (BMI 35.0-39. 9) with comorbidity [facility])        Pertinent laboratory and test data reviewed and discussed with patient. See patient instructions also for other medical advice given    There are no discontinued medications. Follow-up and Dispositions    · Return in about 3 months (around 8/13/2021), or if symptoms worsen or fail to improve, for post test.       5/13/2021 CHF is improving clinically less shortness of breath and edema since Aldactone started. Follow electrolytes closely and adjust potassium as needed. She is trying to follow the diet. Stress test has not shown any ischemia. Continue medical treatment for CHF and hypertension. BP mildly elevated but home BPs are fine and follow the home chart. Diet exercise reinforced again.

## 2021-05-13 NOTE — PROGRESS NOTES
1. Have you been to the ER, urgent care clinic since your last visit? Hospitalized since your last visit? No    2. Have you seen or consulted any other health care providers outside of the [address] [place] [address] since your last visit? Include any pap smears or colon screening. No     3. Since your last visit, have you had any of the following symptoms? Yes      swelling in legs/arms. Explain:states always has swelling     4. Have you had any blood work, X-rays or cardiac testing? Yes today Nuc stress test     Requested: YES     In [facility]: YES    5. Where do you normally have your labs drawn? PCP Office       6. Do you need any refills today?    No

## 2021-05-14 ENCOUNTER — HOSPITAL ENCOUNTER (OUTPATIENT)
Dept: LAB | Age: 76
Discharge: HOME | End: 2021-05-14
Payer: MEDICARE

## 2021-05-14 ENCOUNTER — LAB ONLY (OUTPATIENT)
Dept: FAMILY MEDICINE CLINIC | Age: 76
End: 2021-05-14
Payer: MEDICARE

## 2021-05-14 DIAGNOSIS — Z01.89 ENCOUNTER FOR LABORATORY EXAMINATION: Primary | ICD-10-CM

## 2021-05-14 DIAGNOSIS — R00.2 PALPITATIONS: ICD-10-CM

## 2021-05-14 DIAGNOSIS — Z13.0 SCREENING FOR ENDOCRINE, METABOLIC AND IMMUNITY DISORDER: ICD-10-CM

## 2021-05-14 DIAGNOSIS — I50.9 ACUTE CONGESTIVE HEART FAILURE, UNSPECIFIED HEART FAILURE TYPE (HCC): ICD-10-CM

## 2021-05-14 DIAGNOSIS — Z13.6 SCREENING FOR CARDIOVASCULAR CONDITION: ICD-10-CM

## 2021-05-14 DIAGNOSIS — Z13.29 SCREENING FOR ENDOCRINE, METABOLIC AND IMMUNITY DISORDER: ICD-10-CM

## 2021-05-14 DIAGNOSIS — Z13.228 SCREENING FOR ENDOCRINE, METABOLIC AND IMMUNITY DISORDER: ICD-10-CM

## 2021-05-14 LAB
ANION GAP SERPL CALC-SCNC: 7 MMOL/L (ref 3–18)
BUN SERPL-MCNC: 11 MG/DL (ref 7–18)
BUN/CREAT SERPL: 9 (ref 12–20)
CALCIUM SERPL-MCNC: 8.7 MG/DL (ref 8.5–10.1)
CHLORIDE SERPL-SCNC: 106 MMOL/L (ref 100–111)
CO2 SERPL-SCNC: 27 MMOL/L (ref 21–32)
CREAT SERPL-MCNC: 1.27 MG/DL (ref 0.6–1.3)
GLUCOSE SERPL-MCNC: 120 MG/DL (ref 74–99)
POTASSIUM SERPL-SCNC: 3.5 MMOL/L (ref 3.5–5.5)
SODIUM SERPL-SCNC: 140 MMOL/L (ref 136–145)

## 2021-05-14 PROCEDURE — 36415 COLL VENOUS BLD VENIPUNCTURE: CPT | Performed by: NURSE PRACTITIONER

## 2021-05-14 PROCEDURE — 80048 BASIC METABOLIC PNL TOTAL CA: CPT

## 2021-05-14 PROCEDURE — 36415 COLL VENOUS BLD VENIPUNCTURE: CPT

## 2021-05-14 NOTE — PROGRESS NOTES
Patient presents for lab draw ordered by Dr Will Maya  Ordering Department/Practice:  [facility]  Date Ordered:  5-     The following labs were drawn and sent to [facility]    The following tubes were sent:    Gold  ( 1)    Draw site:  LAC  Pain Level:n  Needle Gauge23  Aseptic technique used  Blood thinners:n  Band-Aid applied  Draw fee added   Procedure tolerated well, patient voiced no complaints.

## 2021-06-02 DIAGNOSIS — I50.9 ACUTE CONGESTIVE HEART FAILURE, UNSPECIFIED HEART FAILURE TYPE (HCC): ICD-10-CM

## 2021-06-02 RX ORDER — SPIRONOLACTONE 25 MG/1
TABLET ORAL
Qty: 30 TABLET | Refills: 1 | Status: SHIPPED | OUTPATIENT
Start: 2021-06-02 | End: 2021-06-30

## 2021-06-04 DIAGNOSIS — I50.9 ACUTE CONGESTIVE HEART FAILURE, UNSPECIFIED HEART FAILURE TYPE (HCC): ICD-10-CM

## 2021-06-04 RX ORDER — HYDROCHLOROTHIAZIDE 25 MG/1
TABLET ORAL
Qty: 30 TABLET | Refills: 1 | Status: SHIPPED | OUTPATIENT
Start: 2021-06-04 | End: 2021-06-30

## 2021-06-30 DIAGNOSIS — I50.9 ACUTE CONGESTIVE HEART FAILURE, UNSPECIFIED HEART FAILURE TYPE (HCC): ICD-10-CM

## 2021-06-30 RX ORDER — SPIRONOLACTONE 25 MG/1
TABLET ORAL
Qty: 30 TABLET | Refills: 1 | Status: SHIPPED | OUTPATIENT
Start: 2021-06-30 | End: 2021-08-30

## 2021-06-30 RX ORDER — HYDROCHLOROTHIAZIDE 25 MG/1
TABLET ORAL
Qty: 30 TABLET | Refills: 1 | Status: SHIPPED | OUTPATIENT
Start: 2021-06-30 | End: 2021-08-30

## 2021-07-06 RX ORDER — AMLODIPINE BESYLATE 2.5 MG/1
TABLET ORAL
Qty: 30 TABLET | Refills: 6 | Status: SHIPPED | OUTPATIENT
Start: 2021-07-06 | End: 2022-01-25

## 2021-07-19 DIAGNOSIS — E87.6 HYPOKALEMIA: ICD-10-CM

## 2021-07-19 RX ORDER — POTASSIUM CHLORIDE 750 MG/1
TABLET, EXTENDED RELEASE ORAL
Qty: 90 TABLET | Refills: 0 | Status: SHIPPED | OUTPATIENT
Start: 2021-07-19 | End: 2021-10-10

## 2021-08-04 DIAGNOSIS — I10 ESSENTIAL HYPERTENSION: Primary | ICD-10-CM

## 2021-08-04 RX ORDER — CLONIDINE HYDROCHLORIDE 0.2 MG/1
0.2 TABLET ORAL 2 TIMES DAILY
Qty: 90 TABLET | Refills: 3 | Status: SHIPPED | OUTPATIENT
Start: 2021-08-04 | End: 2022-02-03

## 2021-08-04 NOTE — TELEPHONE ENCOUNTER
Requested Prescriptions     Pending Prescriptions Disp Refills    cloNIDine HCL (CATAPRES) 0.2 mg tablet 90 Tablet 3     Sig: Take 1 Tablet by mouth two (2) times a day.

## 2021-08-19 ENCOUNTER — OFFICE VISIT (OUTPATIENT)
Dept: CARDIOLOGY CLINIC | Age: 76
End: 2021-08-19
Payer: MEDICARE

## 2021-08-19 VITALS
HEIGHT: 64 IN | SYSTOLIC BLOOD PRESSURE: 136 MMHG | DIASTOLIC BLOOD PRESSURE: 72 MMHG | BODY MASS INDEX: 36.7 KG/M2 | HEART RATE: 66 BPM | WEIGHT: 215 LBS

## 2021-08-19 DIAGNOSIS — I50.32 CHRONIC DIASTOLIC CONGESTIVE HEART FAILURE (HCC): Primary | ICD-10-CM

## 2021-08-19 DIAGNOSIS — I10 ESSENTIAL HYPERTENSION: ICD-10-CM

## 2021-08-19 DIAGNOSIS — E66.01 SEVERE OBESITY (BMI 35.0-39.9) WITH COMORBIDITY (HCC): ICD-10-CM

## 2021-08-19 PROCEDURE — G8417 CALC BMI ABV UP PARAM F/U: HCPCS | Performed by: INTERNAL MEDICINE

## 2021-08-19 PROCEDURE — G8536 NO DOC ELDER MAL SCRN: HCPCS | Performed by: INTERNAL MEDICINE

## 2021-08-19 PROCEDURE — 1101F PT FALLS ASSESS-DOCD LE1/YR: CPT | Performed by: INTERNAL MEDICINE

## 2021-08-19 PROCEDURE — 3017F COLORECTAL CA SCREEN DOC REV: CPT | Performed by: INTERNAL MEDICINE

## 2021-08-19 PROCEDURE — G8754 DIAS BP LESS 90: HCPCS | Performed by: INTERNAL MEDICINE

## 2021-08-19 PROCEDURE — G8752 SYS BP LESS 140: HCPCS | Performed by: INTERNAL MEDICINE

## 2021-08-19 PROCEDURE — G8432 DEP SCR NOT DOC, RNG: HCPCS | Performed by: INTERNAL MEDICINE

## 2021-08-19 PROCEDURE — 99214 OFFICE O/P EST MOD 30 MIN: CPT | Performed by: INTERNAL MEDICINE

## 2021-08-19 PROCEDURE — 1090F PRES/ABSN URINE INCON ASSESS: CPT | Performed by: INTERNAL MEDICINE

## 2021-08-19 PROCEDURE — G8427 DOCREV CUR MEDS BY ELIG CLIN: HCPCS | Performed by: INTERNAL MEDICINE

## 2021-08-19 PROCEDURE — G8399 PT W/DXA RESULTS DOCUMENT: HCPCS | Performed by: INTERNAL MEDICINE

## 2021-08-19 NOTE — PROGRESS NOTES
1. Have you been to the ER, urgent care clinic since your last visit? Hospitalized since your last visit?     no  2. Have you seen or consulted any other health care providers outside of the [address] [place] [address] since your last visit? Include any pap smears or colon screening. No     3. Since your last visit, have you had any of the following symptoms? shortness of breath. 4.  Have you had any blood work, X-rays or cardiac testing? No         5. Where do you normally have your labs drawn?   pcp    6. Do you need any refills today?    no

## 2021-08-19 NOTE — PROGRESS NOTES
HISTORY OF PRESENT ILLNESS  Kenya Bustamante is a 76 y.o. female. 3/19/2021 seen for hypertension but also has other symptoms as noted below. Episode of syncope a couple of weeks ago in the tub when she fell while taking a shower. She thinks she lost her consciousness completely. Denies any aura or dizziness prior to that. Did not have any palpitations prior to that. Upon waking up, she felt completely normal immediately. Hypertension  The history is provided by the medical records. This is a chronic problem. Associated symptoms include shortness of breath. Pertinent negatives include no chest pain and no headaches. Palpitations   The history is provided by the patient. This is a new problem. The current episode started more than 1 week ago (2/20). The problem has been resolved. The problem occurs daily. The problem is associated with nothing. Associated symptoms include lower extremity edema and shortness of breath. Pertinent negatives include no fever, no malaise/fatigue, no chest pain, no claudication, no orthopnea, no PND, no nausea, no vomiting, no headaches, no dizziness and no cough. Her past medical history is significant for hypertension. Shortness of Breath  The history is provided by the patient. This is a new problem. The problem occurs intermittently. The current episode started more than 1 week ago. The problem has not changed since onset. Associated symptoms include leg swelling. Pertinent negatives include no fever, no headaches, no cough, no wheezing, no PND, no orthopnea, no chest pain, no vomiting, no rash and no claudication. The problem's precipitants include exercise (house chores). Leg Swelling  The history is provided by the patient. This is a new problem. The current episode started more than 1 week ago (yrs). The problem occurs daily. The problem has not changed since onset. Associated symptoms include shortness of breath.  Pertinent negatives include no chest pain and no headaches. The symptoms are aggravated by standing. CHF  The history is provided by the medical records. This is a chronic problem. Associated symptoms include shortness of breath. Pertinent negatives include no chest pain and no headaches. No Known Allergies    Past Medical History:   Diagnosis Date    Asthma     COVID-19     Diverticulitis     Hematuria     Hypertension     Positive PPD     Sinusitis     Syncope     Vertigo        Family History   Problem Relation Age of Onset    Cancer Mother     Hypertension Father     Stroke Father     Cancer Sister        Social History     Tobacco Use    Smoking status: Never Smoker    Smokeless tobacco: Never Used   Vaping Use    Vaping Use: Never used   Substance Use Topics    Alcohol use: No    Drug use: Never        Current Outpatient Medications   Medication Sig    cloNIDine HCL (CATAPRES) 0.2 mg tablet Take 1 Tablet by mouth two (2) times a day.  Klor-Con M10 10 mEq tablet TAKE 1 TABLET BY MOUTH EVERY DAY    amLODIPine (NORVASC) 2.5 mg tablet TAKE 1 TABLET BY MOUTH EVERY DAY    hydroCHLOROthiazide (HYDRODIURIL) 25 mg tablet TAKE 1 TABLET BY MOUTH EVERY DAY    spironolactone (ALDACTONE) 25 mg tablet TAKE 1 TABLET BY MOUTH EVERY DAY    albuterol (PROVENTIL HFA, VENTOLIN HFA, PROAIR HFA) 90 mcg/actuation inhaler 1 OR 2 PUFFS EVERY 4 HRS AS NEEDED    ammonium lactate (LAC-HYDRIN) 12 % lotion DIME  SIZED AMOUTN TWICE A DAY TO LEGS 90 DAYS     No current facility-administered medications for this visit. Past Surgical History:   Procedure Laterality Date    ENDOSCOPY, COLON, DIAGNOSTIC  01/29/2008    HX GYN      Fibroid Tumorectomy    HX KNEE ARTHROSCOPY  5/26/10    R knee       Visit Vitals  /72   Pulse 66   Ht 5' 4\" (1.626 m)   Wt 97.5 kg (215 lb)   BMI 36.90 kg/m²       Diagnostic Studies:  I have reviewed the relevant tests done on the patient and show as follows  EKG tracings reviewed by me today.     EKG Results     None XR Results (most recent):  Results from Hospital Encounter encounter on 06/11/15    XR CHEST PA LAT    Narrative  PA and lateral Chest    Indication:  Shortness of breath    Comparison:  May 14 2010    Findings: The cardiomediastinal silhouette is normal.  The lungs are clear showing no  masses, infiltrates or effusions. There is no vascular congestion. Thoracic  spondylosis is evident. Impression  :    No active disease    Ms. Sushil Dean has a reminder for a \"due or due soon\" health maintenance. I have asked that she contact her primary care provider for follow-up on this health maintenance. Review of Systems   Constitutional: Negative for chills, fever, malaise/fatigue and weight loss. HENT: Negative for nosebleeds. Eyes: Negative for discharge. Respiratory: Positive for shortness of breath. Negative for cough and wheezing. Cardiovascular: Positive for leg swelling. Negative for chest pain, palpitations, orthopnea, claudication and PND. Gastrointestinal: Negative for diarrhea, nausea and vomiting. Genitourinary: Negative for dysuria and hematuria. Musculoskeletal: Negative for joint pain. Skin: Negative for rash. Neurological: Negative for dizziness, seizures, loss of consciousness and headaches. Endo/Heme/Allergies: Negative for polydipsia. Does not bruise/bleed easily. Psychiatric/Behavioral: Negative for depression and substance abuse. The patient does not have insomnia.      03/25/21   ECHO ADULT COMPLETE 03/25/2021 3/25/2021    Narrative · LV: Estimated LVEF is 65 - 70%. Visually measured ejection fraction. Normal cavity size and systolic function (ejection fraction normal). Upper   normal wall thickness. Wall motion: normal. Mild (grade 1) left   ventricular diastolic dysfunction. · PA: Pulmonary arterial systolic pressure is 25 mmHg. · Mild hypertrophy of the septum.         Signed by: Germaine Coyle MD     05/13/21   NUCLEAR CARDIAC STRESS TEST 05/13/2021 5/13/2021 Narrative · Baseline ECG: Sinus bradycardia. · Stress test: Negative stress test.  · SPECT: Left ventricular function post-stress was normal. Calculated   ejection fraction is 90%. There is no evidence of transient ischemic   dilation (TID). The TID ratio is 0.98.  · SPECT: Left ventricular perfusion is normal. Myocardial perfusion   imaging supports a low risk stress test.        Signed by: Chidi Morales MD     Physical Exam  Constitutional:       General: She is not in acute distress. Appearance: She is well-developed. She is obese. Comments: Sev obese   HENT:      Head: Normocephalic and atraumatic. Mouth/Throat:      Dentition: Normal dentition. Eyes:      General: No scleral icterus. Right eye: No discharge. Left eye: No discharge. Neck:      Thyroid: No thyromegaly. Vascular: Hepatojugular reflux and JVD present. No carotid bruit. Cardiovascular:      Rate and Rhythm: Normal rate and regular rhythm. Pulses: Intact distal pulses. Heart sounds: Normal heart sounds, S1 normal and S2 normal. No murmur heard. No friction rub. No gallop. Pulmonary:      Effort: Pulmonary effort is normal.      Breath sounds: Normal breath sounds. No wheezing or rales. Abdominal:      Palpations: Abdomen is soft. There is no mass. Tenderness: There is no abdominal tenderness. Musculoskeletal:      Cervical back: Neck supple. Right lower leg: Edema (Trace) present. Left lower leg: Edema (Trace) present. Lymphadenopathy:      Cervical:      Right cervical: No superficial cervical adenopathy. Left cervical: No superficial cervical adenopathy. Skin:     General: Skin is warm and dry. Findings: No rash. Neurological:      Mental Status: She is alert and oriented to person, place, and time.    Psychiatric:         Behavior: Behavior normal.         ASSESSMENT and PLAN    LIPIDS : Results for Ivan Ramirez (MRN [id number]) as of 5/10/2021 09:05   Ref. Range 4/1/2021 09:09 4/1/2021 09:09   Triglyceride Latest Ref Range: <150 MG/ (H) 151 (H)   Cholesterol, total Latest Ref Range: <200 MG/ (H) 201 (H)   HDL Cholesterol Latest Ref Range: 40 - 60 MG/DL 73 (H) 73 (H)   CHOL/HDL Ratio Latest Ref Range: 0 - 5.0   2.9 2.8   VLDL, calculated Latest Units: MG/DL 31.2 30.2   LDL, calculated Latest Ref Range: 0 - 100 MG/.8 (H) 97.8     3/19/2021   Has acute CHF of unclear etiology. Check echo. As CHF improves will need CAD work-up also  Had syncope and is undergoing neurology work-up but will get a mobile telemetry. Increase HCTZ for hypertension and CHF. Follow-up labs in 2 weeks also. She is trying to lose weight. Mediterranean diet guidelines printed. 5/10/2021 CHF is persisting. Echo has shown normal ejection fraction mild LVH. Check a stress test to evaluate ischemia. Add Aldactone and follow-up electrolytes closely. She did not get mobile telemetry equipment and has not had any episodes or dizziness in the last 6 weeks. Will wait on the telemetry for now. Blood pressure is mildly elevated and will hopefully reduce as we give more aggressive diuresis. Discussed diet in detail specially sodium intake. 5/13/2021 CHF is improving clinically less shortness of breath and edema since Aldactone started. Follow electrolytes closely and adjust potassium as needed. She is trying to follow the diet. Stress test has not shown any ischemia. Continue medical treatment for CHF and hypertension. BP mildly elevated but home BPs are fine and follow the home chart. Diet exercise reinforced again. Diagnoses and all orders for this visit:    1. Chronic diastolic congestive heart failure (HCC)  -     METABOLIC PANEL, BASIC; Future    2. Essential hypertension  -     METABOLIC PANEL, BASIC; Future  -     LIPID PANEL; Future    3. Severe obesity (BMI 35.0-39. 9) with comorbidity [facility])        Pertinent laboratory and test data reviewed and discussed with patient. See patient instructions also for other medical advice given    There are no discontinued medications. Follow-up and Dispositions    · Return in about 6 months (around 2/19/2022), or if symptoms worsen or fail to improve, for post test.       8/19/2021 CHF is chronic. NYHA class III. Blood pressure is controlled  Has not lost any significant weight but has gained a few pounds. Diet weight exercise reinforced, encouraged and discussed in detail. Labs as ordered including lipids.

## 2021-08-28 DIAGNOSIS — I50.9 ACUTE CONGESTIVE HEART FAILURE, UNSPECIFIED HEART FAILURE TYPE (HCC): ICD-10-CM

## 2021-08-30 RX ORDER — SPIRONOLACTONE 25 MG/1
TABLET ORAL
Qty: 30 TABLET | Refills: 1 | Status: SHIPPED | OUTPATIENT
Start: 2021-08-30 | End: 2021-09-22

## 2021-08-30 RX ORDER — HYDROCHLOROTHIAZIDE 25 MG/1
TABLET ORAL
Qty: 30 TABLET | Refills: 1 | Status: SHIPPED | OUTPATIENT
Start: 2021-08-30 | End: 2021-09-22

## 2021-09-01 ENCOUNTER — TELEPHONE (OUTPATIENT)
Dept: CARDIOLOGY CLINIC | Age: 76
End: 2021-09-01

## 2021-09-01 NOTE — LETTER
2021     Dr. Avel Kohler  [address] [facility]      Dear Dr. Catherine Edwards:    Re: Cheryl Juarez   : 1945       Ms. Екатерина Dorsey is cleared from cardiac view with the understanding that patient will have moderate(5-10%) risk for this neck surgery that is scheduled for 2021. Please watch for CHF. If you have any questions or any further assistance is needed please contact our office. Sincerely,         Signed By: Nicholas Marshall. Raji Castillo MD    2021           Nicholas Castillo M.D.    cc:  Valerie Cline NP

## 2021-09-01 NOTE — TELEPHONE ENCOUNTER
Clearance requested by Dr Cecilia Greco, patient scheduled 9/20/21, cervical fusion, last seen 8/19/2021.  Fax# [phone]

## 2021-09-01 NOTE — TELEPHONE ENCOUNTER
Cleared from cardiac view with the understanding that patient will have moderate(5-10%) risk for this neck surgery.   Please watch for CHF

## 2021-09-02 ENCOUNTER — HOSPITAL ENCOUNTER (OUTPATIENT)
Dept: LAB | Age: 76
Discharge: HOME | End: 2021-09-02
Payer: MEDICARE

## 2021-09-02 ENCOUNTER — LAB ONLY (OUTPATIENT)
Dept: FAMILY MEDICINE CLINIC | Age: 76
End: 2021-09-02
Payer: MEDICARE

## 2021-09-02 DIAGNOSIS — I50.32 CHRONIC DIASTOLIC CONGESTIVE HEART FAILURE (HCC): ICD-10-CM

## 2021-09-02 DIAGNOSIS — I10 ESSENTIAL HYPERTENSION: ICD-10-CM

## 2021-09-02 DIAGNOSIS — Z01.89 ENCOUNTER FOR LABORATORY TEST: Primary | ICD-10-CM

## 2021-09-02 LAB
ANION GAP SERPL CALC-SCNC: 6 MMOL/L (ref 3–18)
BUN SERPL-MCNC: 17 MG/DL (ref 7–18)
BUN/CREAT SERPL: 12 (ref 12–20)
CALCIUM SERPL-MCNC: 9.2 MG/DL (ref 8.5–10.1)
CHLORIDE SERPL-SCNC: 109 MMOL/L (ref 100–111)
CHOLEST SERPL-MCNC: 183 MG/DL
CO2 SERPL-SCNC: 26 MMOL/L (ref 21–32)
CREAT SERPL-MCNC: 1.4 MG/DL (ref 0.6–1.3)
GLUCOSE SERPL-MCNC: 100 MG/DL (ref 74–99)
HDLC SERPL-MCNC: 65 MG/DL (ref 40–60)
HDLC SERPL: 2.8 {RATIO} (ref 0–5)
LDLC SERPL CALC-MCNC: 93 MG/DL (ref 0–100)
LIPID PROFILE,FLP: ABNORMAL
POTASSIUM SERPL-SCNC: 4.2 MMOL/L (ref 3.5–5.5)
SODIUM SERPL-SCNC: 141 MMOL/L (ref 136–145)
TRIGL SERPL-MCNC: 125 MG/DL (ref ?–150)
VLDLC SERPL CALC-MCNC: 25 MG/DL

## 2021-09-02 PROCEDURE — 80061 LIPID PANEL: CPT

## 2021-09-02 PROCEDURE — 80048 BASIC METABOLIC PNL TOTAL CA: CPT

## 2021-09-02 PROCEDURE — 36415 COLL VENOUS BLD VENIPUNCTURE: CPT | Performed by: NURSE PRACTITIONER

## 2021-09-02 PROCEDURE — 36415 COLL VENOUS BLD VENIPUNCTURE: CPT

## 2021-09-02 NOTE — PROGRESS NOTES
Patient in for labs today. Labs ordered by PCP. Patient tolerated well and there are no concerns. Venipuncture to the left arm.

## 2021-09-03 ENCOUNTER — TELEPHONE (OUTPATIENT)
Dept: CARDIOLOGY CLINIC | Age: 76
End: 2021-09-03

## 2021-09-03 DIAGNOSIS — I50.9 ACUTE CONGESTIVE HEART FAILURE, UNSPECIFIED HEART FAILURE TYPE (HCC): Primary | ICD-10-CM

## 2021-09-03 NOTE — TELEPHONE ENCOUNTER
----- Message from Regis Sharp MD sent at 9/3/2021 11:06 AM EDT -----  Please contact patient and do the following asap  Discontinue oral potassium replacementReduce HCTZ to every other day  Rpt BMP  in 60 days

## 2021-09-03 NOTE — PROGRESS NOTES
Please contact patient and do the following asap  Discontinue oral potassium replacementReduce HCTZ to every other day  Rpt BMP  in 60 days

## 2021-09-14 ENCOUNTER — OFFICE VISIT (OUTPATIENT)
Dept: FAMILY MEDICINE CLINIC | Age: 76
End: 2021-09-14
Payer: MEDICARE

## 2021-09-14 VITALS
SYSTOLIC BLOOD PRESSURE: 116 MMHG | HEART RATE: 67 BPM | TEMPERATURE: 98.1 F | WEIGHT: 215 LBS | HEIGHT: 63 IN | DIASTOLIC BLOOD PRESSURE: 71 MMHG | BODY MASS INDEX: 38.09 KG/M2 | RESPIRATION RATE: 20 BRPM

## 2021-09-14 DIAGNOSIS — Z01.818 PREOP EXAMINATION: ICD-10-CM

## 2021-09-14 PROCEDURE — G8752 SYS BP LESS 140: HCPCS | Performed by: NURSE PRACTITIONER

## 2021-09-14 PROCEDURE — G8432 DEP SCR NOT DOC, RNG: HCPCS | Performed by: NURSE PRACTITIONER

## 2021-09-14 PROCEDURE — G8417 CALC BMI ABV UP PARAM F/U: HCPCS | Performed by: NURSE PRACTITIONER

## 2021-09-14 PROCEDURE — 1101F PT FALLS ASSESS-DOCD LE1/YR: CPT | Performed by: NURSE PRACTITIONER

## 2021-09-14 PROCEDURE — 1090F PRES/ABSN URINE INCON ASSESS: CPT | Performed by: NURSE PRACTITIONER

## 2021-09-14 PROCEDURE — G8427 DOCREV CUR MEDS BY ELIG CLIN: HCPCS | Performed by: NURSE PRACTITIONER

## 2021-09-14 PROCEDURE — 99213 OFFICE O/P EST LOW 20 MIN: CPT | Performed by: NURSE PRACTITIONER

## 2021-09-14 PROCEDURE — G8754 DIAS BP LESS 90: HCPCS | Performed by: NURSE PRACTITIONER

## 2021-09-14 PROCEDURE — G8399 PT W/DXA RESULTS DOCUMENT: HCPCS | Performed by: NURSE PRACTITIONER

## 2021-09-14 PROCEDURE — G8536 NO DOC ELDER MAL SCRN: HCPCS | Performed by: NURSE PRACTITIONER

## 2021-09-14 NOTE — PROGRESS NOTES
OFFICE NOTE    Ej Arriaga is a 68 y.o. female presenting today for the following:  Chief Complaint   Patient presents with    Pre-op Exam     back/neck surgery  9-      HPI   Patient comes in for preoperative physical exam.  Patient will be undergoing a posterior C3 - Tidecomp and fusion on 9/20/21 at [facility] by Dr. Huynh Staff at with [facility]. They deny any current complaints. Patient previously had Lab work and EKG done by [facility] on 9/9/2021. BMP and CBC reviewed today. Paperwork completed today and will send a copy of this office note with paper work to [facility]. Review of Systems   Constitutional: Negative for fatigue and fever. HENT: Negative. Eyes: Negative. Respiratory: Negative for cough, chest tightness, shortness of breath and wheezing. Cardiovascular: Negative for chest pain and palpitations. Neurological: Negative for dizziness, light-headedness and headaches.          History  Past Medical History:   Diagnosis Date    Asthma     COVID-19     Diverticulitis     Hematuria     Hypertension     Positive PPD     Sinusitis     Syncope     Vertigo        Past Surgical History:   Procedure Laterality Date    ENDOSCOPY, COLON, DIAGNOSTIC  01/29/2008    HX GYN      Fibroid Tumorectomy    HX KNEE ARTHROSCOPY  5/26/10    R knee       Social History     Socioeconomic History    Marital status: SINGLE     Spouse name: Not on file    Number of children: Not on file    Years of education: Not on file    Highest education level: Not on file   Occupational History    Not on file   Tobacco Use    Smoking status: Never Smoker    Smokeless tobacco: Never Used   Vaping Use    Vaping Use: Never used   Substance and Sexual Activity    Alcohol use: No    Drug use: Never    Sexual activity: Not on file   Other Topics Concern    Not on file   Social History Narrative    Not on file     Social Determinants of Health     Financial Resource Strain:     Difficulty of Paying Living Expenses:    Food Insecurity:     Worried About [address] in the Last Year:     [address] in the Last Year:    Transportation Needs:     Lack of Transportation (Medical):  Lack of Transportation (Non-Medical):    Physical Activity:     Days of Exercise per Week:     Minutes of Exercise per Session:    Stress:     Feeling of Stress :    Social Connections:     Frequency of Communication with Friends and Family:     Frequency of Social Gatherings with Friends and Family:     Attends Spiritism Services:     Active Member of Clubs or Organizations:     Attends Club or Organization Meetings:     Marital Status:    Intimate Partner Violence:     Fear of Current or Ex-Partner:     Emotionally Abused:     Physically Abused:     Sexually Abused:        No Known Allergies    Current Outpatient Medications   Medication Sig Dispense Refill    hydroCHLOROthiazide (HYDRODIURIL) 25 mg tablet TAKE 1 TABLET BY MOUTH EVERY DAY 30 Tablet 1    spironolactone (ALDACTONE) 25 mg tablet TAKE 1 TABLET BY MOUTH EVERY DAY 30 Tablet 1    cloNIDine HCL (CATAPRES) 0.2 mg tablet Take 1 Tablet by mouth two (2) times a day. 90 Tablet 3    amLODIPine (NORVASC) 2.5 mg tablet TAKE 1 TABLET BY MOUTH EVERY DAY 30 Tablet 6    albuterol (PROVENTIL HFA, VENTOLIN HFA, PROAIR HFA) 90 mcg/actuation inhaler 1 OR 2 PUFFS EVERY 4 HRS AS NEEDED      ammonium lactate (LAC-HYDRIN) 12 % lotion DIME  SIZED AMOUTN TWICE A DAY TO LEGS 90 DAYS      Klor-Con M10 10 mEq tablet TAKE 1 TABLET BY MOUTH EVERY DAY (Patient not taking: Reported on 9/14/2021) 90 Tablet 0         Patient Care Team:  Patient Care Team:  Kya Merritt NP as PCP - General (Nurse Practitioner)  Kya Merritt NP as PCP - [facility]      LABS:  No results found for any visits on 09/14/21.      RADIOLOGY:  No recent results      Physical Exam  Vitals and nursing note reviewed. Constitutional:       Appearance: Normal appearance. She is well-developed. HENT:      Right Ear: External ear normal.      Left Ear: External ear normal.   Eyes:      Pupils: Pupils are equal, round, and reactive to light. Cardiovascular:      Rate and Rhythm: Normal rate and regular rhythm. Heart sounds: Normal heart sounds. Pulmonary:      Effort: Pulmonary effort is normal. No respiratory distress. Breath sounds: Normal breath sounds. No wheezing or rales. Abdominal:      General: Bowel sounds are normal. There is no distension. Palpations: Abdomen is soft. Tenderness: There is no abdominal tenderness. There is no rebound. Musculoskeletal:         General: Normal range of motion. Cervical back: Normal range of motion and neck supple. Lymphadenopathy:      Cervical: No cervical adenopathy. Skin:     General: Skin is warm and dry. Neurological:      Mental Status: She is alert and oriented to person, place, and time. Deep Tendon Reflexes: Reflexes are normal and symmetric. Psychiatric:         Mood and Affect: Mood normal.           Vitals:    09/14/21 0909   BP: 116/71   Pulse: 67   Resp: 20   Temp: 98.1 °F (36.7 °C)   TempSrc: Oral   Weight: 215 lb (97.5 kg)   Height: 5' 3\" (1.6 m)   PainSc:   0 - No pain         Assessment and Plan    1. Preop examination  Based off physical exam today and review of labs ordered I have cleared patient for upcoming surgery on my end. MDM    Procedures      *Plan of care reviewed with patient. Patient in agreement with plan and expresses understanding. All questions answered and patient encouraged to call or RTO if further questions or concerns. Advised patient if symptoms worsen to go to nearest ER or call 911. AVS and recommendations given to patient upon discharge.

## 2021-09-14 NOTE — PROGRESS NOTES
1. Have you been to the ER, urgent care clinic since your last visit? Hospitalized since your last visit? No    2. Have you seen or consulted any other health care providers outside of the [address] [place] [address] since your last visit? Include any pap smears or colon screening.  No

## 2021-09-22 DIAGNOSIS — I50.9 ACUTE CONGESTIVE HEART FAILURE, UNSPECIFIED HEART FAILURE TYPE (HCC): ICD-10-CM

## 2021-09-22 RX ORDER — HYDROCHLOROTHIAZIDE 25 MG/1
TABLET ORAL
Qty: 30 TABLET | Refills: 1 | Status: SHIPPED | OUTPATIENT
Start: 2021-09-22 | End: 2021-10-25

## 2021-09-22 RX ORDER — SPIRONOLACTONE 25 MG/1
TABLET ORAL
Qty: 30 TABLET | Refills: 1 | Status: SHIPPED | OUTPATIENT
Start: 2021-09-22 | End: 2021-10-25

## 2021-10-10 DIAGNOSIS — E87.6 HYPOKALEMIA: ICD-10-CM

## 2021-10-10 RX ORDER — POTASSIUM CHLORIDE 750 MG/1
TABLET, EXTENDED RELEASE ORAL
Qty: 90 TABLET | Refills: 0 | Status: SHIPPED | OUTPATIENT
Start: 2021-10-10 | End: 2022-03-03

## 2021-10-25 DIAGNOSIS — I50.9 ACUTE CONGESTIVE HEART FAILURE, UNSPECIFIED HEART FAILURE TYPE (HCC): ICD-10-CM

## 2021-10-25 RX ORDER — HYDROCHLOROTHIAZIDE 25 MG/1
TABLET ORAL
Qty: 30 TABLET | Refills: 1 | Status: SHIPPED | OUTPATIENT
Start: 2021-10-25 | End: 2021-11-22

## 2021-10-25 RX ORDER — SPIRONOLACTONE 25 MG/1
TABLET ORAL
Qty: 30 TABLET | Refills: 1 | Status: SHIPPED | OUTPATIENT
Start: 2021-10-25 | End: 2021-11-22

## 2021-11-20 DIAGNOSIS — I50.9 ACUTE CONGESTIVE HEART FAILURE, UNSPECIFIED HEART FAILURE TYPE (HCC): ICD-10-CM

## 2021-11-22 RX ORDER — SPIRONOLACTONE 25 MG/1
TABLET ORAL
Qty: 30 TABLET | Refills: 1 | Status: SHIPPED | OUTPATIENT
Start: 2021-11-22 | End: 2021-12-15

## 2021-11-22 RX ORDER — HYDROCHLOROTHIAZIDE 25 MG/1
TABLET ORAL
Qty: 30 TABLET | Refills: 1 | Status: SHIPPED | OUTPATIENT
Start: 2021-11-22 | End: 2021-12-15

## 2021-12-15 DIAGNOSIS — I50.9 ACUTE CONGESTIVE HEART FAILURE, UNSPECIFIED HEART FAILURE TYPE (HCC): ICD-10-CM

## 2021-12-15 RX ORDER — HYDROCHLOROTHIAZIDE 25 MG/1
TABLET ORAL
Qty: 30 TABLET | Refills: 1 | Status: SHIPPED | OUTPATIENT
Start: 2021-12-15 | End: 2022-01-17

## 2021-12-17 ENCOUNTER — VIRTUAL VISIT (OUTPATIENT)
Dept: FAMILY MEDICINE CLINIC | Age: 76
End: 2021-12-17
Payer: MEDICARE

## 2021-12-17 DIAGNOSIS — M79.89 FOOT SWELLING: ICD-10-CM

## 2021-12-17 DIAGNOSIS — M79.662 BILATERAL CALF PAIN: ICD-10-CM

## 2021-12-17 DIAGNOSIS — M25.471 RIGHT ANKLE SWELLING: ICD-10-CM

## 2021-12-17 DIAGNOSIS — M79.661 BILATERAL CALF PAIN: ICD-10-CM

## 2021-12-17 DIAGNOSIS — M25.472 LEFT ANKLE SWELLING: ICD-10-CM

## 2021-12-17 PROCEDURE — 1101F PT FALLS ASSESS-DOCD LE1/YR: CPT | Performed by: NURSE PRACTITIONER

## 2021-12-17 PROCEDURE — G8432 DEP SCR NOT DOC, RNG: HCPCS | Performed by: NURSE PRACTITIONER

## 2021-12-17 PROCEDURE — G8756 NO BP MEASURE DOC: HCPCS | Performed by: NURSE PRACTITIONER

## 2021-12-17 PROCEDURE — G8427 DOCREV CUR MEDS BY ELIG CLIN: HCPCS | Performed by: NURSE PRACTITIONER

## 2021-12-17 PROCEDURE — 99213 OFFICE O/P EST LOW 20 MIN: CPT | Performed by: NURSE PRACTITIONER

## 2021-12-17 PROCEDURE — 1090F PRES/ABSN URINE INCON ASSESS: CPT | Performed by: NURSE PRACTITIONER

## 2021-12-17 PROCEDURE — G8417 CALC BMI ABV UP PARAM F/U: HCPCS | Performed by: NURSE PRACTITIONER

## 2021-12-17 PROCEDURE — G8536 NO DOC ELDER MAL SCRN: HCPCS | Performed by: NURSE PRACTITIONER

## 2021-12-17 PROCEDURE — G8399 PT W/DXA RESULTS DOCUMENT: HCPCS | Performed by: NURSE PRACTITIONER

## 2021-12-17 NOTE — PROGRESS NOTES
1. \"Have you been to the ER, urgent care clinic since your last visit? Hospitalized since your last visit? \" No    2. \"Have you seen or consulted any other health care providers outside of the [address] [place] [address] since your last visit? \" No     3. For patients aged 39-70: Has the patient had a colonoscopy / FIT/ Cologuard? NA based on age or sex     If the patient is female:    3. For patients aged 41-77: Has the patient had a mammogram within the past 2 years? NA based on age or sex    11. For patients aged 21-65: Has the patient had a pap smear?  NA based on age or sex

## 2021-12-17 NOTE — PROGRESS NOTES
Eric Montano is a 68 y.o. female who was seen by synchronous (real-time) audio-video technology on 12/17/2021 for Leg Swelling (and feet x 1 week) and Leg Pain        Assessment & Plan:   Diagnoses and all orders for this visit:    1. Bilateral calf pain    2. Foot swelling    3. Left ankle swelling    4. Right ankle swelling    Recommend patient go to ER to r/o DVT    I spent at least 15 minutes on this visit with this established patient. 712  Subjective:     Leg pain and Swelling  Patient states she have been having leg pain and swelling for 1 week. Patient states her feet, ankle and calf   are swollen and painful bilaterally. She states it is hard to bare any pressure on her right foot so she have not been walking. Informed patient to go to ER due to a possible DVT. She denies chest pain or SOB. Patient agree with plan and  states she is going to ER now. No other concerns today. Past Medical History:   Diagnosis Date    Asthma     COVID-19     Diverticulitis     Hematuria     Hypertension     Positive PPD     Sinusitis     Syncope     Vertigo         Prior to Admission medications    Medication Sig Start Date End Date Taking? Authorizing Provider   hydroCHLOROthiazide (HYDRODIURIL) 25 mg tablet TAKE 1 TABLET BY MOUTH EVERY DAY 12/15/21  Yes Noelle Romero NP   spironolactone (ALDACTONE) 25 mg tablet TAKE 1 TABLET BY MOUTH EVERY DAY 12/15/21  Yes Jozef Laguna MD   cloNIDine HCL (CATAPRES) 0.2 mg tablet Take 1 Tablet by mouth two (2) times a day.  8/4/21  Yes Belinda Dela Cruz NP   amLODIPine (NORVASC) 2.5 mg tablet TAKE 1 TABLET BY MOUTH EVERY DAY 7/6/21  Yes Belinda Dela Cruz NP   albuterol (PROVENTIL HFA, VENTOLIN HFA, PROAIR HFA) 90 mcg/actuation inhaler 1 OR 2 PUFFS EVERY 4 HRS AS NEEDED 11/18/20  Yes Provider, Historical   ammonium lactate (LAC-HYDRIN) 12 % lotion DIME  SIZED AMOUTN TWICE A DAY TO LEGS 90 DAYS 11/18/20  Yes Provider, Historical   Klor-Con M10 10 mEq tablet TAKE 1 TABLET BY MOUTH EVERY DAY  Patient not taking: Reported on 12/17/2021 10/10/21   Reyna Hui, NP     Patient Active Problem List   Diagnosis Code    Essential hypertension I10    Positive PPD R76.11    Diverticulitis K57.92    Hematuria R31.9    GI bleeding K92.2    COLUMBA (acute kidney injury) ([facility] [address] N17.9    AMS (altered mental status) R41.82    Cholelithiases K80.20    Acute congestive heart failure (HCC) I50.9    Syncope R55    Palpitations R00.2    Pneumonia J18.9    Suspected COVID-19 virus infection Z20.822    Severe obesity (BMI 35.0-39. 9) with comorbidity (HCC) E66.01     Current Outpatient Medications   Medication Sig Dispense Refill    hydroCHLOROthiazide (HYDRODIURIL) 25 mg tablet TAKE 1 TABLET BY MOUTH EVERY DAY 30 Tablet 1    spironolactone (ALDACTONE) 25 mg tablet TAKE 1 TABLET BY MOUTH EVERY DAY 30 Tablet 1    cloNIDine HCL (CATAPRES) 0.2 mg tablet Take 1 Tablet by mouth two (2) times a day. 90 Tablet 3    amLODIPine (NORVASC) 2.5 mg tablet TAKE 1 TABLET BY MOUTH EVERY DAY 30 Tablet 6    albuterol (PROVENTIL HFA, VENTOLIN HFA, PROAIR HFA) 90 mcg/actuation inhaler 1 OR 2 PUFFS EVERY 4 HRS AS NEEDED      ammonium lactate (LAC-HYDRIN) 12 % lotion DIME  SIZED AMOUTN TWICE A DAY TO LEGS 90 DAYS      Klor-Con M10 10 mEq tablet TAKE 1 TABLET BY MOUTH EVERY DAY (Patient not taking: Reported on 12/17/2021) 90 Tablet 0     Past Medical History:   Diagnosis Date    Asthma     COVID-19     Diverticulitis     Hematuria     Hypertension     Positive PPD     Sinusitis     Syncope     Vertigo      Social History     Tobacco Use    Smoking status: Never Smoker    Smokeless tobacco: Never Used   Substance Use Topics    Alcohol use: No       Review of Systems   Constitutional: Negative for chills, fever and malaise/fatigue. Eyes: Negative. Respiratory: Negative for cough, shortness of breath and wheezing.     Cardiovascular: Negative for chest pain, palpitations and leg swelling. Gastrointestinal: Negative. Musculoskeletal:        Leg pain when walking   Skin:        Swelling in ankles, calf and feet   Neurological: Negative. Objective:     Patient-Reported Vitals 12/17/2021   Patient-Reported Weight 202   Patient-Reported Systolic  947   Patient-Reported Diastolic 75   Patient-Reported LMP menopause      General: alert, cooperative, no distress   Mental  status: normal mood, behavior, speech, dress, motor activity, and thought processes, able to follow commands   HENT: NCAT   Neck: no visualized mass   Resp: no respiratory distress   Neuro: no gross deficits   Skin: no discoloration or lesions of concern on visible areas   Psychiatric: normal affect, consistent with stated mood, no evidence of hallucinations     Additional exam findings: We discussed the expected course, resolution and complications of the diagnosis(es) in detail. Medication risks, benefits, costs, interactions, and alternatives were discussed as indicated. I advised her to contact the office if her condition worsens, changes or fails to improve as anticipated. She expressed understanding with the diagnosis(es) and plan. Magdaleno Wheatley, who was evaluated through a patient-initiated, synchronous (real-time) audio-video encounter, and/or her healthcare decision maker, is aware that it is a billable service, with coverage as determined by her insurance carrier. She provided verbal consent to proceed: Yes, and patient identification was verified. It was conducted pursuant to the emergency declaration under the [address] [place] [address], [address] [place] [address] authority and the [employer] Act. A caregiver was present when appropriate. Ability to conduct physical exam was limited. I was in the office. The patient was at home.       Mary Carmen Meyers NP

## 2022-01-15 DIAGNOSIS — I50.9 ACUTE CONGESTIVE HEART FAILURE, UNSPECIFIED HEART FAILURE TYPE (HCC): ICD-10-CM

## 2022-01-17 RX ORDER — HYDROCHLOROTHIAZIDE 25 MG/1
TABLET ORAL
Qty: 30 TABLET | Refills: 1 | Status: SHIPPED | OUTPATIENT
Start: 2022-01-17 | End: 2022-02-15

## 2022-01-18 RX ORDER — SPIRONOLACTONE 25 MG/1
TABLET ORAL
Qty: 30 TABLET | Refills: 1 | Status: SHIPPED | OUTPATIENT
Start: 2022-01-18 | End: 2022-02-17

## 2022-01-25 ENCOUNTER — TELEPHONE (OUTPATIENT)
Dept: FAMILY MEDICINE CLINIC | Age: 77
End: 2022-01-25

## 2022-01-25 RX ORDER — AMLODIPINE BESYLATE 2.5 MG/1
TABLET ORAL
Qty: 90 TABLET | Refills: 2 | Status: SHIPPED | OUTPATIENT
Start: 2022-01-25 | End: 2022-10-20

## 2022-01-25 NOTE — TELEPHONE ENCOUNTER
----- Message from Oleg Brito sent at 1/25/2022  1:52 PM EST -----  Subject: Message to Provider    QUESTIONS  Information for Provider? Pt has appointment on 2/3/22. She would like to   come in for labs before the appointment. She has cardiology appt on 02/18   and her cardiologist would also like those completed before that appt. Please call pt back if lab needs scheduled or if she can walk in.   ---------------------------------------------------------------------------  --------------  CALL BACK INFO  What is the best way for the office to contact you? OK to leave message on   voicemail  Preferred Call Back Phone Number? [phone]  ---------------------------------------------------------------------------  --------------  SCRIPT ANSWERS  Relationship to Patient?  Self

## 2022-01-26 DIAGNOSIS — Z13.29 SCREENING FOR ENDOCRINE, METABOLIC AND IMMUNITY DISORDER: Primary | ICD-10-CM

## 2022-01-26 DIAGNOSIS — Z13.6 SCREENING FOR CARDIOVASCULAR CONDITION: ICD-10-CM

## 2022-01-26 DIAGNOSIS — Z13.228 SCREENING FOR ENDOCRINE, METABOLIC AND IMMUNITY DISORDER: Primary | ICD-10-CM

## 2022-01-26 DIAGNOSIS — Z13.0 SCREENING FOR ENDOCRINE, METABOLIC AND IMMUNITY DISORDER: Primary | ICD-10-CM

## 2022-01-26 NOTE — TELEPHONE ENCOUNTER
Spoke with patient and advised that the labs would be printed she could pick them up before doing to [facility] for lab draw.   Patient verbalized understanding

## 2022-02-03 ENCOUNTER — OFFICE VISIT (OUTPATIENT)
Dept: FAMILY MEDICINE CLINIC | Age: 77
End: 2022-02-03
Payer: MEDICARE

## 2022-02-03 VITALS
SYSTOLIC BLOOD PRESSURE: 114 MMHG | HEART RATE: 67 BPM | DIASTOLIC BLOOD PRESSURE: 70 MMHG | BODY MASS INDEX: 35.97 KG/M2 | WEIGHT: 203 LBS | OXYGEN SATURATION: 99 % | RESPIRATION RATE: 18 BRPM | TEMPERATURE: 98.1 F | HEIGHT: 63 IN

## 2022-02-03 DIAGNOSIS — I50.32 CHRONIC DIASTOLIC CONGESTIVE HEART FAILURE (HCC): ICD-10-CM

## 2022-02-03 DIAGNOSIS — I10 ESSENTIAL HYPERTENSION: ICD-10-CM

## 2022-02-03 DIAGNOSIS — G95.9 CERVICAL MYELOPATHY (HCC): ICD-10-CM

## 2022-02-03 DIAGNOSIS — Z12.31 ENCOUNTER FOR SCREENING MAMMOGRAM FOR MALIGNANT NEOPLASM OF BREAST: ICD-10-CM

## 2022-02-03 DIAGNOSIS — E66.01 SEVERE OBESITY (BMI 35.0-39.9) WITH COMORBIDITY (HCC): ICD-10-CM

## 2022-02-03 DIAGNOSIS — N18.32 CKD STAGE G3B/A1, GFR 30-44 AND ALBUMIN CREATININE RATIO <30 MG/G (HCC): ICD-10-CM

## 2022-02-03 PROCEDURE — G8536 NO DOC ELDER MAL SCRN: HCPCS | Performed by: FAMILY MEDICINE

## 2022-02-03 PROCEDURE — G8399 PT W/DXA RESULTS DOCUMENT: HCPCS | Performed by: FAMILY MEDICINE

## 2022-02-03 PROCEDURE — 99213 OFFICE O/P EST LOW 20 MIN: CPT | Performed by: FAMILY MEDICINE

## 2022-02-03 PROCEDURE — 1090F PRES/ABSN URINE INCON ASSESS: CPT | Performed by: FAMILY MEDICINE

## 2022-02-03 PROCEDURE — G8752 SYS BP LESS 140: HCPCS | Performed by: FAMILY MEDICINE

## 2022-02-03 PROCEDURE — G8427 DOCREV CUR MEDS BY ELIG CLIN: HCPCS | Performed by: FAMILY MEDICINE

## 2022-02-03 PROCEDURE — G8510 SCR DEP NEG, NO PLAN REQD: HCPCS | Performed by: FAMILY MEDICINE

## 2022-02-03 PROCEDURE — G8417 CALC BMI ABV UP PARAM F/U: HCPCS | Performed by: FAMILY MEDICINE

## 2022-02-03 PROCEDURE — 1101F PT FALLS ASSESS-DOCD LE1/YR: CPT | Performed by: FAMILY MEDICINE

## 2022-02-03 PROCEDURE — G8754 DIAS BP LESS 90: HCPCS | Performed by: FAMILY MEDICINE

## 2022-02-03 RX ORDER — CLONIDINE HYDROCHLORIDE 0.2 MG/1
0.2 TABLET ORAL 2 TIMES DAILY
Qty: 180 TABLET | Refills: 1 | Status: SHIPPED | OUTPATIENT
Start: 2022-02-03 | End: 2022-10-10

## 2022-02-03 NOTE — PROGRESS NOTES
Chief Complaint   Patient presents with   [facility]     1. \"Have you been to the ER, urgent care clinic since your last visit? Hospitalized since your last visit? \" No    2. \"Have you seen or consulted any other health care providers outside of the [address] [place] [address] since your last visit? \" No     3. For patients aged 39-70: Has the patient had a colonoscopy / FIT/ Cologuard? NA - based on age      If the patient is female:    4. For patients aged 41-77: Has the patient had a mammogram within the past 2 years? NA - based on age or sex      11. For patients aged 21-65: Has the patient had a pap smear?  NA - based on age or sex

## 2022-02-03 NOTE — PATIENT INSTRUCTIONS
Deep Vein Thrombosis: Care Instructions  Overview     A deep vein thrombosis (DVT) is a blood clot in certain veins, usually in the legs, pelvis, or arms. Blood clots in these veins need to be treated because they can get bigger, break loose, and travel through the bloodstream to the lungs. A blood clot in a lung can be life-threatening. The doctor may have given you a blood thinner (anticoagulant). A blood thinner can stop the blood clot from growing larger and prevent new clots from forming. You will need to take a blood thinner for at least 3 months. The doctor has checked you carefully, but problems can develop later. If you notice any problems or new symptoms, get medical treatment right away. Follow-up care is a key part of your treatment and safety. Be sure to make and go to all appointments, and call your doctor if you are having problems. It's also a good idea to know your test results and keep a list of the medicines you take. How can you care for yourself at home? · Take your medicines exactly as prescribed. Call your doctor if you think you are having a problem with your medicine. · If you are taking a blood thinner, be sure you get instructions about how to take your medicine safely. Blood thinners can cause serious bleeding problems. · Try to walk several times a day. · Wear compression stockings if your doctor recommends them. These stockings are tighter at the feet than on the legs. They may reduce pain and swelling in your legs. But there are different types of stockings, and they need to fit right. So your doctor will recommend what you need. · When you sit, use a pillow to raise the arm or leg that has the blood clot. Try to keep it above the level of your heart. When should you call for help? Call 911 anytime you think you may need emergency care.  For example, call if:    · You passed out (lost consciousness).     · You have symptoms of a blood clot in your lung (called a pulmonary embolism). These include:  ? Sudden chest pain. ? Trouble breathing. ? Coughing up blood. Call your doctor now or seek immediate medical care if:    · You have new or worse trouble breathing.     · You are dizzy or lightheaded, or you feel like you may faint.     · You have symptoms of a blood clot in your arm or leg. These may include:  ? Pain in the arm, calf, back of the knee, thigh, or groin. ? Redness and swelling in the arm, leg, or groin. Watch closely for changes in your health, and be sure to contact your doctor if:    · You do not get better as expected. Where can you learn more? Go to http://www.gray.com/  Enter A674 in the search box to learn more about \"Deep Vein Thrombosis: Care Instructions. \"  Current as of: July 6, 2021               Content Version: 13.0  © 2006-2021 [employer]. Care instructions adapted under license by [employer] (which disclaims liability or warranty for this information). If you have questions about a medical condition or this instruction, always ask your healthcare professional. [place] [address] any warranty or liability for your use of this information.

## 2022-02-03 NOTE — PROGRESS NOTES
HPI  This is a 68 yr old -American female with past medical history significant for essential hypertension, congestive heart failure, Asthma, chronic kidney disease, diverticulitis, obesity, cervical myelopathy status post fusion, DVT of the right lower extremity who is here to follow up on chronic problems. 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 Patient denies any current pain in either lower extremities, continues to have baseline swelling bilateral lower extremities. Patient is seeing cardiology as well as vascular surgery. Patient is on Xarelto. Per patient she was advised to get checked for possible malignancy due to history of DVT in the right lower extremity. Congestive heart failure  Patient is being followed by cardiology. She is on spironolactone. Denies any shortness of breath, chest pain, palpitations. She has baseline bilateral lower extremity swellings. Primary hypertension  Patient's blood pressure well controlled on current medications of hydrochlorothiazide, spironolactone, amlodipine. Denies any headache, blurred vision, chest pain, palpitations, shortness of breath. Chronic kidney disease  GFR while hospitalized was 46. We will consider referral to nephrology for declining kidney function. Denies any difficulty with urination. Routine health maintenance  -Immunizations: Declined at this time  -colon cancer screening: Cologuard was recently done per patient will find results. -Breast cancer screening: Patient states that she did not have mammogram completed we will order this for her at this time. -PAP smear: Denies any vaginal bleeding so there is no need for Pap smear  -Routine labs: Ordered  -Eye Exam: Advised yearly eye exams.   -counseled about diet rich in green leafy vegetables and protein, less carbs /exercise at least 150 minutes a week/weight loss        Past Medical History  Past Medical History:   Diagnosis Date    Asthma     COVID-19     Diverticulitis     Hematuria     Hypertension     Positive PPD     Sinusitis     Syncope     Vertigo        Surgical History  Past Surgical History:   Procedure Laterality Date    ENDOSCOPY, COLON, DIAGNOSTIC  01/29/2008    HX GYN      Fibroid Tumorectomy    HX KNEE ARTHROSCOPY  5/26/10    R knee        Medications  Current Outpatient Medications   Medication Sig Dispense Refill    cloNIDine HCL (CATAPRES) 0.2 mg tablet TAKE 1 TABLET BY MOUTH TWO (2) TIMES A DAY.  180 Tablet 1    amLODIPine (NORVASC) 2.5 mg tablet TAKE 1 TABLET BY MOUTH EVERY DAY 90 Tablet 2    spironolactone (ALDACTONE) 25 mg tablet TAKE 1 TABLET BY MOUTH EVERY DAY 30 Tablet 1    hydroCHLOROthiazide (HYDRODIURIL) 25 mg tablet TAKE 1 TABLET BY MOUTH EVERY DAY 30 Tablet 1    albuterol (PROVENTIL HFA, VENTOLIN HFA, PROAIR HFA) 90 mcg/actuation inhaler 1 OR 2 PUFFS EVERY 4 HRS AS NEEDED      ammonium lactate (LAC-HYDRIN) 12 % lotion DIME  SIZED AMOUTN TWICE A DAY TO LEGS 90 DAYS      Klor-Con M10 10 mEq tablet TAKE 1 TABLET BY MOUTH EVERY DAY (Patient not taking: Reported on 12/17/2021) 90 Tablet 0       Allergies  No Known Allergies    Family History  Family History   Problem Relation Age of Onset    Cancer Mother     Hypertension Father     Stroke Father     Cancer Sister        Social History  Social History     Socioeconomic History    Marital status: SINGLE     Spouse name: Not on file    Number of children: Not on file    Years of education: Not on file    Highest education level: Not on file   Occupational History    Not on file   Tobacco Use    Smoking status: Never Smoker    Smokeless tobacco: Never Used   Vaping Use    Vaping Use: Never used   Substance and Sexual Activity    Alcohol use: No    Drug use: Never    Sexual activity: Not on file   Other Topics Concern    Not on file   Social History Narrative    Not on file     Social Determinants of Health     Financial Resource Strain:     Difficulty of Paying Living Expenses: Not on file   Food Insecurity:     Worried About Running Out of Food in the Last Year: Not on file    Kendrick of Food in the Last Year: Not on file   Transportation Needs:     Lack of Transportation (Medical): Not on file    Lack of Transportation (Non-Medical): Not on file   Physical Activity:     Days of Exercise per Week: Not on file    Minutes of Exercise per Session: Not on file   Stress:     Feeling of Stress : Not on file   Social Connections:     Frequency of Communication with Friends and Family: Not on file    Frequency of Social Gatherings with Friends and Family: Not on file    Attends [employer] Services: Not on file    Active Member of [address] [place] [address] [employer] or Organizations: Not on file    Attends Club or Organization Meetings: Not on file    Marital Status: Not on file   Intimate Partner Violence:     Fear of Current or Ex-Partner: Not on file    Emotionally Abused: Not on file    Physically Abused: Not on file    Sexually Abused: Not on file   Housing Stability:     Unable to Pay for Housing in the Last Year: Not on file    Number of [place] in the Last Year: Not on file    Unstable Housing in the Last Year: Not on file       Review of Systems  Review of Systems   Constitutional: Negative for chills and fever. Respiratory: Negative for cough and shortness of breath. Cardiovascular: Negative for chest pain and leg swelling. Gastrointestinal: Negative for abdominal pain, nausea and vomiting. Skin: Negative for rash. Neurological: Negative for dizziness and headaches.          Vital Signs  Visit Vitals  /70 (BP 1 Location: Left upper arm, BP Patient Position: Sitting, BP Cuff Size: Adult)   Pulse 67   Temp 98.1 °F (36.7 °C) (Temporal)   Resp 18   Ht 5' 3\" (1.6 m)   Wt 203 lb (92.1 kg)   SpO2 99%   BMI 35.96 kg/m² Physical Exam  Physical Exam  Vitals reviewed. Constitutional:       Appearance: Normal appearance. HENT:      Head: Normocephalic and atraumatic. Right Ear: External ear normal.      Left Ear: External ear normal.      Nose: Nose normal.      Mouth/Throat:      Mouth: Mucous membranes are moist.   Eyes:      Extraocular Movements: Extraocular movements intact. Conjunctiva/sclera: Conjunctivae normal.   Cardiovascular:      Rate and Rhythm: Normal rate and regular rhythm. Pulses: Normal pulses. Heart sounds: Normal heart sounds. Pulmonary:      Effort: Pulmonary effort is normal.      Breath sounds: Normal breath sounds. Abdominal:      General: Bowel sounds are normal.      Palpations: Abdomen is soft. Musculoskeletal:         General: Normal range of motion. Cervical back: Normal range of motion. Skin:     General: Skin is warm. Neurological:      General: No focal deficit present. Mental Status: She is alert and oriented to person, place, and time. Cranial Nerves: No cranial nerve deficit. Motor: No weakness.       Gait: Gait normal.   Psychiatric:         Mood and Affect: Mood normal.         Behavior: Behavior normal.                Results  Results for orders placed or performed during the hospital encounter of 34/42/92   METABOLIC PANEL, BASIC   Result Value Ref Range    Sodium 141 136 - 145 mmol/L    Potassium 4.2 3.5 - 5.5 mmol/L    Chloride 109 100 - 111 mmol/L    CO2 26 21 - 32 mmol/L    Anion gap 6 3.0 - 18 mmol/L    Glucose 100 (H) 74 - 99 mg/dL    BUN 17 7.0 - 18 MG/DL    Creatinine 1.40 (H) 0.6 - 1.3 MG/DL    BUN/Creatinine ratio 12 12 - 20      GFR est AA 44 (L) >60 ml/min/1.73m2    GFR est non-AA 37 (L) >60 ml/min/1.73m2    Calcium 9.2 8.5 - 10.1 MG/DL   LIPID PANEL   Result Value Ref Range    LIPID PROFILE          Cholesterol, total 183 <200 MG/DL    Triglyceride 125 <150 MG/DL    HDL Cholesterol 65 (H) 40 - 60 MG/DL    LDL, calculated 93 0 - 100 MG/DL    VLDL, calculated 25 MG/DL    CHOL/HDL Ratio 2.8 0 - 5.0         ASSESSMENT and PLAN  1. Essential hypertension  -medication regimen: Currently on clonidine, hydrochlorothiazide, spironolactone  -Lipid panel: Completed  -CMP: Ordered  -counseled about diet rich in green leafy vegetables/protein, decrease intake of red meat/sodium/and cholesterol.   -counseled about 150 min of moderate intensity exercise a week. Alternating between cardio and strength training.   -counseled about medication adherence and weight loss  -counseled to keep log of blood pressure at home and bring to next appointment   -Return visit: 6 weeks    2. CKD stage G3b/A1, GFR 30-44 and albumin creatinine ratio <30 mg/g (HCC)  -Fluctuates between 43 and 46 GFR.  -Denies any trouble with urination  -Consider referral to nephrology at the next visit    3. Severe obesity (BMI 35.0-39. 9) with comorbidity ([address]  -Counseled about weight loss, meal planning, calorie counting. 4. Cervical myelopathy ([facility]  -Patient had cervical fusion in October or November.  -Denies any neck pain    5. Chronic diastolic congestive heart failure ([address]  -Echo shows ejection fraction of  -Patient has appointment with cardiologist  -Currently on spironolactone  -Denies any shortness of breath    6. Encounter for screening mammogram for malignant neoplasm of breast  - [facility] MAMMO BI SCREENING INCL CAD; Future           Follow-up and Dispositions    · Return in about 6 weeks (around 3/17/2022), or if symptoms worsen or fail to improve, for follow up with labs. I have discussed the diagnosis with the patient and the intended plan of care as seen in the above orders. The patient has received an after-visit summary and questions were answered concerning future plans. I have discussed medication, side effects, and warnings with the patient in detail. The patient verbalized understanding and is in agreement with the plan of care.  The patient will contact the office with any additional concerns. I spent at least 30 minutes on this visit with this established patient. Deloris Osorio MD    PLEASE NOTE:   This document has been produced using voice recognition software.  Unrecognized errors in transcription may be present

## 2022-02-10 ENCOUNTER — HOSPITAL ENCOUNTER (OUTPATIENT)
Dept: MAMMOGRAPHY | Age: 77
Discharge: HOME | End: 2022-02-10
Attending: FAMILY MEDICINE
Payer: MEDICARE

## 2022-02-10 DIAGNOSIS — Z12.31 ENCOUNTER FOR SCREENING MAMMOGRAM FOR MALIGNANT NEOPLASM OF BREAST: ICD-10-CM

## 2022-02-10 PROCEDURE — 77063 BREAST TOMOSYNTHESIS BI: CPT

## 2022-02-15 DIAGNOSIS — I50.9 ACUTE CONGESTIVE HEART FAILURE, UNSPECIFIED HEART FAILURE TYPE (HCC): ICD-10-CM

## 2022-02-15 RX ORDER — HYDROCHLOROTHIAZIDE 25 MG/1
TABLET ORAL
Qty: 30 TABLET | Refills: 1 | Status: SHIPPED | OUTPATIENT
Start: 2022-02-15 | End: 2022-04-15

## 2022-02-17 RX ORDER — SPIRONOLACTONE 25 MG/1
TABLET ORAL
Qty: 30 TABLET | Refills: 1 | Status: SHIPPED | OUTPATIENT
Start: 2022-02-17 | End: 2022-04-15

## 2022-03-03 ENCOUNTER — OFFICE VISIT (OUTPATIENT)
Dept: CARDIOLOGY CLINIC | Age: 77
End: 2022-03-03
Payer: MEDICARE

## 2022-03-03 VITALS
SYSTOLIC BLOOD PRESSURE: 131 MMHG | WEIGHT: 203 LBS | HEIGHT: 63 IN | OXYGEN SATURATION: 99 % | DIASTOLIC BLOOD PRESSURE: 76 MMHG | HEART RATE: 64 BPM | BODY MASS INDEX: 35.97 KG/M2

## 2022-03-03 DIAGNOSIS — Z86.718 HISTORY OF DVT (DEEP VEIN THROMBOSIS): ICD-10-CM

## 2022-03-03 DIAGNOSIS — E66.01 SEVERE OBESITY (BMI 35.0-39.9) WITH COMORBIDITY (HCC): ICD-10-CM

## 2022-03-03 DIAGNOSIS — I50.32 CHRONIC DIASTOLIC CONGESTIVE HEART FAILURE (HCC): Primary | ICD-10-CM

## 2022-03-03 DIAGNOSIS — I10 ESSENTIAL HYPERTENSION: ICD-10-CM

## 2022-03-03 PROCEDURE — G8536 NO DOC ELDER MAL SCRN: HCPCS | Performed by: INTERNAL MEDICINE

## 2022-03-03 PROCEDURE — 99214 OFFICE O/P EST MOD 30 MIN: CPT | Performed by: INTERNAL MEDICINE

## 2022-03-03 PROCEDURE — G8754 DIAS BP LESS 90: HCPCS | Performed by: INTERNAL MEDICINE

## 2022-03-03 PROCEDURE — G8752 SYS BP LESS 140: HCPCS | Performed by: INTERNAL MEDICINE

## 2022-03-03 PROCEDURE — 1090F PRES/ABSN URINE INCON ASSESS: CPT | Performed by: INTERNAL MEDICINE

## 2022-03-03 PROCEDURE — G8399 PT W/DXA RESULTS DOCUMENT: HCPCS | Performed by: INTERNAL MEDICINE

## 2022-03-03 PROCEDURE — G8432 DEP SCR NOT DOC, RNG: HCPCS | Performed by: INTERNAL MEDICINE

## 2022-03-03 PROCEDURE — 1101F PT FALLS ASSESS-DOCD LE1/YR: CPT | Performed by: INTERNAL MEDICINE

## 2022-03-03 PROCEDURE — G8427 DOCREV CUR MEDS BY ELIG CLIN: HCPCS | Performed by: INTERNAL MEDICINE

## 2022-03-03 PROCEDURE — G8417 CALC BMI ABV UP PARAM F/U: HCPCS | Performed by: INTERNAL MEDICINE

## 2022-03-03 RX ORDER — RIVAROXABAN 20 MG/1
20 TABLET, FILM COATED ORAL DAILY
COMMUNITY
Start: 2022-02-10

## 2022-03-03 NOTE — PROGRESS NOTES
1. Have you been to the ER, urgent care clinic since your last visit? Hospitalized since your last visit? Yes Where: [facility] for bloodclots and neck [facility]    2. Have you seen or consulted any other health care providers outside of the [address] [place] [address] since your last visit? Include any pap smears or colon screening. No    3. Since your last visit, have you had any of the following symptoms? shortness of breath and swelling in legs/arms. 4.  Have you had any blood work, X-rays or cardiac testing? Yes Where: Blood work @ [place] [address]        5. Where do you normally have your labs drawn? [facility]    6. Do you need any refills today?    yes

## 2022-03-03 NOTE — PATIENT INSTRUCTIONS
Medications Discontinued During This Encounter   Medication Reason    Klor-Con M10 10 mEq tablet Not A Current Medication          A Healthy Heart: Care Instructions  Your Care Instructions     Coronary artery disease, also called heart disease, occurs when a substance called plaque builds up in the vessels that supply oxygen-rich blood to your heart muscle. This can narrow the blood vessels and reduce blood flow. A heart attack happens when blood flow is completely blocked. A high-fat diet, smoking, and other factors increase the risk of heart disease. Your doctor has found that you have a chance of having heart disease. You can do lots of things to keep your heart healthy. It may not be easy, but you can change your diet, exercise more, and quit smoking. These steps really work to lower your chance of heart disease. Follow-up care is a key part of your treatment and safety. Be sure to make and go to all appointments, and call your doctor if you are having problems. It's also a good idea to know your test results and keep a list of the medicines you take. How can you care for yourself at home? Diet    · Use less salt when you cook and eat. This helps lower your blood pressure. Taste food before salting. Add only a little salt when you think you need it. With time, your taste buds will adjust to less salt.     · Eat fewer snack items, fast foods, canned soups, and other high-salt, high-fat, processed foods.     · Read food labels and try to avoid saturated and trans fats. They increase your risk of heart disease by raising cholesterol levels.     · Limit the amount of solid fat-butter, margarine, and shortening-you eat. Use olive, peanut, or canola oil when you cook. Bake, broil, and steam foods instead of frying them.     · Eat a variety of fruit and vegetables every day. Dark green, deep orange, red, or yellow fruits and vegetables are especially good for you.  Examples include spinach, carrots, peaches, and berries.     · Foods high in fiber can reduce your cholesterol and provide important vitamins and minerals. High-fiber foods include whole-grain cereals and breads, oatmeal, beans, brown rice, citrus fruits, and apples.     · Eat lean proteins. Heart-healthy proteins include seafood, lean meats and poultry, eggs, beans, peas, nuts, seeds, and soy products.     · Limit drinks and foods with added sugar. These include candy, desserts, and soda pop. Lifestyle changes    · If your doctor recommends it, get more exercise. Walking is a good choice. Bit by bit, increase the amount you walk every day. Try for at least 30 minutes on most days of the week. You also may want to swim, bike, or do other activities.     · Do not smoke. If you need help quitting, talk to your doctor about stop-smoking programs and medicines. These can increase your chances of quitting for good. Quitting smoking may be the most important step you can take to protect your heart. It is never too late to quit.     · Limit alcohol to 2 drinks a day for men and 1 drink a day for women. Too much alcohol can cause health problems.     · Manage other health problems such as diabetes, high blood pressure, and high cholesterol. If you think you may have a problem with alcohol or drug use, talk to your doctor. Medicines    · Take your medicines exactly as prescribed. Call your doctor if you think you are having a problem with your medicine.     · If your doctor recommends aspirin, take the amount directed each day. Make sure you take aspirin and not another kind of pain reliever, such as acetaminophen (Tylenol). When should you call for help? Call 911 if you have symptoms of a heart attack.  These may include:    · Chest pain or pressure, or a strange feeling in the chest.     · Sweating.     · Shortness of breath.     · Pain, pressure, or a strange feeling in the back, neck, jaw, or upper belly or in one or both shoulders or arms.     · Lightheadedness or sudden weakness.     · A fast or irregular heartbeat. After you call 911, the  may tell you to chew 1 adult-strength or 2 to 4 low-dose aspirin. Wait for an ambulance. Do not try to drive yourself. Watch closely for changes in your health, and be sure to contact your doctor if you have any problems. Where can you learn more? Go to http://www.aparicio.com/  Enter F075 in the search box to learn more about \"A Healthy Heart: Care Instructions. \"  Current as of: April 29, 2021               Content Version: 13.0  © 3358-2477 [employer]. Care instructions adapted under license by [employer] (which disclaims liability or warranty for this information). If you have questions about a medical condition or this instruction, always ask your healthcare professional. [address] any warranty or liability for your use of this information.

## 2022-03-03 NOTE — PROGRESS NOTES
HISTORY OF PRESENT ILLNESS  Flint Cranker is a 68 y.o. female. Follow-up of CHF, hypertension, obesity    3/19/2021 seen for hypertension but also has other symptoms as noted below. Episode of syncope a couple of weeks ago in the tub when she fell while taking a shower. She thinks she lost her consciousness completely. Denies any aura or dizziness prior to that. Did not have any palpitations prior to that. Upon waking up, she felt completely normal immediately. Shortness of Breath  The history is provided by the patient. This is a new problem. The problem occurs intermittently. The current episode started more than 1 week ago. The problem has not changed since onset. Associated symptoms include leg swelling. Pertinent negatives include no fever, no headaches, no cough, no wheezing, no PND, no orthopnea, no chest pain, no vomiting, no rash and no claudication. The problem's precipitants include exercise (house chores). Leg Swelling  The history is provided by the patient. This is a new problem. The current episode started more than 1 week ago (yrs). The problem occurs daily. The problem has not changed since onset. Associated symptoms include shortness of breath. Pertinent negatives include no chest pain and no headaches. The symptoms are aggravated by standing. Follow-up  Associated symptoms include shortness of breath. Pertinent negatives include no chest pain and no headaches.      No Known Allergies    Past Medical History:   Diagnosis Date    Asthma     COVID-19     Diverticulitis     Hematuria     Hypertension     Positive PPD     Sinusitis     Syncope     Vertigo        Family History   Problem Relation Age of Onset    Cancer Mother     Hypertension Father     Stroke Father     Cancer Sister        Social History     Tobacco Use    Smoking status: Never Smoker    Smokeless tobacco: Never Used   Vaping Use    Vaping Use: Never used   Substance Use Topics    Alcohol use: No    Drug use: Never        Current Outpatient Medications   Medication Sig    Xarelto 20 mg tab tablet Take 20 mg by mouth daily.  spironolactone (ALDACTONE) 25 mg tablet TAKE 1 TABLET BY MOUTH EVERY DAY    hydroCHLOROthiazide (HYDRODIURIL) 25 mg tablet TAKE 1 TABLET BY MOUTH EVERY DAY    cloNIDine HCL (CATAPRES) 0.2 mg tablet TAKE 1 TABLET BY MOUTH TWO (2) TIMES A DAY.  amLODIPine (NORVASC) 2.5 mg tablet TAKE 1 TABLET BY MOUTH EVERY DAY    albuterol (PROVENTIL HFA, VENTOLIN HFA, PROAIR HFA) 90 mcg/actuation inhaler 1 OR 2 PUFFS EVERY 4 HRS AS NEEDED    ammonium lactate (LAC-HYDRIN) 12 % lotion DIME  SIZED AMOUTN TWICE A DAY TO LEGS 90 DAYS     No current facility-administered medications for this visit. Past Surgical History:   Procedure Laterality Date    ENDOSCOPY, COLON, DIAGNOSTIC  01/29/2008    HX GYN      Fibroid Tumorectomy    HX KNEE ARTHROSCOPY  5/26/10    R knee       Visit Vitals  /76 (BP 1 Location: Left upper arm, BP Patient Position: Sitting, BP Cuff Size: Large adult)   Pulse 64   Ht 5' 3\" (1.6 m)   Wt 92.1 kg (203 lb)   SpO2 99%   BMI 35.96 kg/m²       Diagnostic Studies:  I have reviewed the relevant tests done on the patient and show as follows  EKG tracings reviewed by me today. EKG Results     None        XR Results (most recent):  Results from Hospital Encounter encounter on 06/11/15    XR CHEST PA LAT    Narrative  PA and lateral Chest    Indication:  Shortness of breath    Comparison:  May 14 2010    Findings: The cardiomediastinal silhouette is normal.  The lungs are clear showing no  masses, infiltrates or effusions. There is no vascular congestion. Thoracic  spondylosis is evident. Impression  :    No active disease    Ms. Jayda Cortes has a reminder for a \"due or due soon\" health maintenance. I have asked that she contact her primary care provider for follow-up on this health maintenance.     Review of Systems   Constitutional: Negative for chills, fever, malaise/fatigue and weight loss. HENT: Negative for nosebleeds. Eyes: Negative for discharge. Respiratory: Positive for shortness of breath. Negative for cough and wheezing. Cardiovascular: Positive for leg swelling. Negative for chest pain, palpitations, orthopnea, claudication and PND. Gastrointestinal: Negative for diarrhea, nausea and vomiting. Genitourinary: Negative for dysuria and hematuria. Musculoskeletal: Negative for joint pain. Skin: Negative for rash. Neurological: Negative for dizziness, seizures, loss of consciousness and headaches. Endo/Heme/Allergies: Negative for polydipsia. Does not bruise/bleed easily. Psychiatric/Behavioral: Negative for depression and substance abuse. The patient does not have insomnia.      03/25/21   ECHO ADULT COMPLETE 03/25/2021 3/25/2021    Narrative · LV: Estimated LVEF is 65 - 70%. Visually measured ejection fraction. Normal cavity size and systolic function (ejection fraction normal). Upper   normal wall thickness. Wall motion: normal. Mild (grade 1) left   ventricular diastolic dysfunction. · PA: Pulmonary arterial systolic pressure is 25 mmHg. · Mild hypertrophy of the septum. Signed by: Armando Sheikh MD     05/13/21   NUCLEAR CARDIAC STRESS TEST 05/13/2021 5/13/2021    Narrative · Baseline ECG: Sinus bradycardia. · Stress test: Negative stress test.  · SPECT: Left ventricular function post-stress was normal. Calculated   ejection fraction is 90%. There is no evidence of transient ischemic   dilation (TID). The TID ratio is 0.98.  · SPECT: Left ventricular perfusion is normal. Myocardial perfusion   imaging supports a low risk stress test.        Signed by: Armando Sheikh MD     Physical Exam  Constitutional:       General: She is not in acute distress. Appearance: She is well-developed. She is obese. Comments: Sev obese   HENT:      Head: Normocephalic and atraumatic.       Mouth/Throat:      Dentition: Normal dentition. Eyes:      General: No scleral icterus. Right eye: No discharge. Left eye: No discharge. Neck:      Thyroid: No thyromegaly. Vascular: Hepatojugular reflux and JVD present. No carotid bruit. Cardiovascular:      Rate and Rhythm: Normal rate and regular rhythm. Pulses: Intact distal pulses. Heart sounds: Normal heart sounds, S1 normal and S2 normal. No murmur heard. No friction rub. No gallop. Pulmonary:      Effort: Pulmonary effort is normal.      Breath sounds: Normal breath sounds. No wheezing or rales. Abdominal:      Palpations: Abdomen is soft. There is no mass. Tenderness: There is no abdominal tenderness. Musculoskeletal:      Cervical back: Neck supple. Right lower leg: Edema (Trace) present. Left lower leg: Edema (Trace) present. Lymphadenopathy:      Cervical:      Right cervical: No superficial cervical adenopathy. Left cervical: No superficial cervical adenopathy. Skin:     General: Skin is warm and dry. Findings: No rash. Neurological:      Mental Status: She is alert and oriented to person, place, and time. Psychiatric:         Behavior: Behavior normal.         ASSESSMENT and PLAN    LIPIDS : Results for Mayela Oneil (MRN [id number]) as of 3/3/2022 14:06   Ref. Range 4/1/2021 09:09 4/1/2021 09:09 5/14/2021 10:40 9/2/2021 09:00   Triglyceride Latest Ref Range: <150 MG/ (H) 151 (H)  125   Cholesterol, total Latest Ref Range: <200 MG/ (H) 201 (H)  183   HDL Cholesterol Latest Ref Range: 40 - 60 MG/DL 73 (H) 73 (H)  65 (H)   CHOL/HDL Ratio Latest Ref Range: 0 - 5.0   2.9 2.8  2.8   VLDL, calculated Latest Units: MG/DL 31.2 30.2  25   LDL, calculated Latest Ref Range: 0 - 100 MG/.8 (H) 97.8  93     History of DVT: Right leg 1/22;    3/19/2021   Has acute CHF of unclear etiology. Check echo.   As CHF improves will need CAD work-up also  Had syncope and is undergoing neurology work-up but will get a mobile telemetry. Increase HCTZ for hypertension and CHF. Follow-up labs in 2 weeks also. She is trying to lose weight. Mediterranean diet guidelines printed. 5/10/2021 CHF is persisting. Echo has shown normal ejection fraction mild LVH. Check a stress test to evaluate ischemia. Add Aldactone and follow-up electrolytes closely. She did not get mobile telemetry equipment and has not had any episodes or dizziness in the last 6 weeks. Will wait on the telemetry for now. Blood pressure is mildly elevated and will hopefully reduce as we give more aggressive diuresis. Discussed diet in detail specially sodium intake. 5/13/2021 CHF is improving clinically less shortness of breath and edema since Aldactone started. Follow electrolytes closely and adjust potassium as needed. She is trying to follow the diet. Stress test has not shown any ischemia. Continue medical treatment for CHF and hypertension. BP mildly elevated but home BPs are fine and follow the home chart. Diet exercise reinforced again. 8/19/2021 CHF is chronic. NYHA class III. Blood pressure is controlled  Has not lost any significant weight but has gained a few pounds. Diet weight exercise reinforced, encouraged and discussed in detail. Labs as ordered including lipids. Diagnoses and all orders for this visit:    1. Chronic diastolic congestive heart failure (HCC)  -     Comp Stocking,Knee,Regular,Sml misc; 2 Box by Does Not Apply route daily. Use while upright  Indications: edema    2. Essential hypertension    3. Severe obesity (BMI 35.0-39. 9) with comorbidity ([address]    4. History of DVT (deep vein thrombosis)        Pertinent laboratory and test data reviewed and discussed with patient.   See patient instructions also for other medical advice given    Medications Discontinued During This Encounter   Medication Reason    Klor-Con M10 10 mEq tablet Not A Current Medication       Follow-up and Dispositions    · Return in about 6 months (around 9/3/2022), or if symptoms worsen or fail to improve. 3/3/2022 CHF is stable and compensated with mild edema. NYHA class II. Blood pressure is controlled. Diet weight and exercise discussed. She is trying to lose weight and has successfully lost 12 pounds since last visit. She was found to have DVT in the right leg and Xarelto was started by vascular.

## 2022-03-11 ENCOUNTER — OFFICE VISIT (OUTPATIENT)
Dept: FAMILY MEDICINE CLINIC | Age: 77
End: 2022-03-11
Payer: MEDICARE

## 2022-03-11 ENCOUNTER — TELEPHONE (OUTPATIENT)
Dept: FAMILY MEDICINE CLINIC | Age: 77
End: 2022-03-11

## 2022-03-11 VITALS
SYSTOLIC BLOOD PRESSURE: 108 MMHG | TEMPERATURE: 98.2 F | RESPIRATION RATE: 20 BRPM | DIASTOLIC BLOOD PRESSURE: 67 MMHG | HEIGHT: 63 IN | OXYGEN SATURATION: 98 % | HEART RATE: 79 BPM | WEIGHT: 200 LBS | BODY MASS INDEX: 35.44 KG/M2

## 2022-03-11 DIAGNOSIS — Z71.89 ACP (ADVANCE CARE PLANNING): ICD-10-CM

## 2022-03-11 DIAGNOSIS — Z13.31 SCREENING FOR DEPRESSION: ICD-10-CM

## 2022-03-11 DIAGNOSIS — Z00.00 MEDICARE ANNUAL WELLNESS VISIT, SUBSEQUENT: ICD-10-CM

## 2022-03-11 DIAGNOSIS — N18.32 CKD STAGE G3B/A1, GFR 30-44 AND ALBUMIN CREATININE RATIO <30 MG/G (HCC): ICD-10-CM

## 2022-03-11 PROCEDURE — G8752 SYS BP LESS 140: HCPCS | Performed by: FAMILY MEDICINE

## 2022-03-11 PROCEDURE — G8399 PT W/DXA RESULTS DOCUMENT: HCPCS | Performed by: FAMILY MEDICINE

## 2022-03-11 PROCEDURE — 1090F PRES/ABSN URINE INCON ASSESS: CPT | Performed by: FAMILY MEDICINE

## 2022-03-11 PROCEDURE — G8417 CALC BMI ABV UP PARAM F/U: HCPCS | Performed by: FAMILY MEDICINE

## 2022-03-11 PROCEDURE — G8536 NO DOC ELDER MAL SCRN: HCPCS | Performed by: FAMILY MEDICINE

## 2022-03-11 PROCEDURE — 1101F PT FALLS ASSESS-DOCD LE1/YR: CPT | Performed by: FAMILY MEDICINE

## 2022-03-11 PROCEDURE — G8754 DIAS BP LESS 90: HCPCS | Performed by: FAMILY MEDICINE

## 2022-03-11 PROCEDURE — G8427 DOCREV CUR MEDS BY ELIG CLIN: HCPCS | Performed by: FAMILY MEDICINE

## 2022-03-11 PROCEDURE — 99213 OFFICE O/P EST LOW 20 MIN: CPT | Performed by: FAMILY MEDICINE

## 2022-03-11 PROCEDURE — 99497 ADVNCD CARE PLAN 30 MIN: CPT | Performed by: FAMILY MEDICINE

## 2022-03-11 PROCEDURE — G8510 SCR DEP NEG, NO PLAN REQD: HCPCS | Performed by: FAMILY MEDICINE

## 2022-03-11 PROCEDURE — G0438 PPPS, INITIAL VISIT: HCPCS | Performed by: FAMILY MEDICINE

## 2022-03-11 NOTE — PROGRESS NOTES
HPI  This is a 68 yr old -American female with past medical history significant for essential hypertension, congestive heart failure, Asthma, chronic kidney disease, diverticulitis, obesity, cervical myelopathy status post fusion, DVT of the right lower extremity who is here to follow up on chronic problems and review lab work    CKD stage 3b  GFR: 40 and was previously 46, no decrease in urine production. Denies any abdominal pain, nausea, vomiting, burning on urination. We will have patient establish care with nephrologist to be preemptive. We will check vitamin D and PTH levels. 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 Patient denies any current pain in either lower extremities, continues to have baseline swelling bilateral lower extremities. Patient is seeing cardiology as well as vascular surgery. Patient is on Xarelto. Per patient she was advised to get checked for possible malignancy due to history of DVT in the right lower extremity. She is following up with vascular surgery regularly      Congestive heart failure  Patient is being followed by cardiology. She is on spironolactone. Denies any shortness of breath, chest pain, palpitations. She has baseline bilateral lower extremity swellings.       Past Medical History  Past Medical History:   Diagnosis Date    Asthma     COVID-19     Diverticulitis     Hematuria     Hypertension     Positive PPD     Sinusitis     Syncope     Vertigo        Surgical History  Past Surgical History:   Procedure Laterality Date    ENDOSCOPY, COLON, DIAGNOSTIC  01/29/2008    HX GYN      Fibroid Tumorectomy    HX KNEE ARTHROSCOPY  5/26/10    R knee        Medications  Current Outpatient Medications   Medication Sig Dispense Refill    Xarelto 20 mg tab tablet Take 20 mg by mouth daily.       spironolactone (ALDACTONE) 25 mg tablet TAKE 1 TABLET BY MOUTH EVERY DAY 30 Tablet 1    hydroCHLOROthiazide (HYDRODIURIL) 25 mg tablet TAKE 1 TABLET BY MOUTH EVERY DAY 30 Tablet 1    cloNIDine HCL (CATAPRES) 0.2 mg tablet TAKE 1 TABLET BY MOUTH TWO (2) TIMES A DAY. 180 Tablet 1    amLODIPine (NORVASC) 2.5 mg tablet TAKE 1 TABLET BY MOUTH EVERY DAY 90 Tablet 2    albuterol (PROVENTIL HFA, VENTOLIN HFA, PROAIR HFA) 90 mcg/actuation inhaler 1 OR 2 PUFFS EVERY 4 HRS AS NEEDED      ammonium lactate (LAC-HYDRIN) 12 % lotion DIME  SIZED AMOUTN TWICE A DAY TO LEGS 90 DAYS      Comp Stocking,Knee,Regular,Sml misc 2 Box by Does Not Apply route daily. Use while upright  Indications: edema (Patient not taking: Reported on 3/11/2022) 2 Each 0       Allergies  No Known Allergies    Family History  Family History   Problem Relation Age of Onset    Cancer Mother     Hypertension Father     Stroke Father     Cancer Sister        Social History  Social History     Socioeconomic History    Marital status: SINGLE     Spouse name: Not on file    Number of children: Not on file    Years of education: Not on file    Highest education level: Not on file   Occupational History    Not on file   Tobacco Use    Smoking status: Never Smoker    Smokeless tobacco: Never Used   Vaping Use    Vaping Use: Never used   Substance and Sexual Activity    Alcohol use: No    Drug use: Yes    Sexual activity: Not on file   Other Topics Concern    Not on file   Social History Narrative    Not on file     Social Determinants of Health     Financial Resource Strain:     Difficulty of Paying Living Expenses: Not on file   Food Insecurity:     Worried About Running Out of Food in the Last Year: Not on file    [place] of Food in the Last Year: Not on file   Transportation Needs:     Lack of Transportation (Medical): Not on file    Lack of Transportation (Non-Medical):  Not on file   Physical Activity:     Days of Exercise per Week: Not on file    Minutes of Exercise per Session: Not on file   Stress:     Feeling of Stress : Not on file   Social Connections:     Frequency of Communication with Friends and Family: Not on file    Frequency of Social Gatherings with Friends and Family: Not on file    Attends Mosque Services: Not on file    Active Member of Clubs or Organizations: Not on file    Attends Club or Organization Meetings: Not on file    Marital Status: Not on file   Intimate Partner Violence:     Fear of Current or Ex-Partner: Not on file    Emotionally Abused: Not on file    Physically Abused: Not on file    Sexually Abused: Not on file   Housing Stability:     Unable to Pay for Housing in the Last Year: Not on file    Number of [place] in the Last Year: Not on file    Unstable Housing in the Last Year: Not on file       Review of Systems  Review of Systems   Constitutional: Negative for chills and fever. Respiratory: Negative for cough and shortness of breath. Cardiovascular: Negative for chest pain and leg swelling. Gastrointestinal: Negative for abdominal pain, nausea and vomiting. Skin: Negative for rash. Neurological: Negative for dizziness and headaches. Vital Signs  Visit Vitals  /67 (BP 1 Location: Left upper arm, BP Patient Position: Sitting, BP Cuff Size: Adult long)   Pulse 79   Temp 98.2 °F (36.8 °C) (Temporal)   Resp 20   Ht 5' 3\" (1.6 m)   Wt 200 lb (90.7 kg)   SpO2 98%   BMI 35.43 kg/m²         Physical Exam  Physical Exam  Vitals reviewed. Constitutional:       Appearance: Normal appearance. HENT:      Head: Normocephalic and atraumatic. Right Ear: External ear normal.      Left Ear: External ear normal.      Nose: Nose normal.      Mouth/Throat:      Mouth: Mucous membranes are moist.   Eyes:      Extraocular Movements: Extraocular movements intact.       Conjunctiva/sclera: Conjunctivae normal.   Cardiovascular:      Rate and Rhythm: Normal rate and regular rhythm. Pulses: Normal pulses. Heart sounds: Normal heart sounds. No murmur heard. No gallop. Pulmonary:      Effort: Pulmonary effort is normal. No respiratory distress. Breath sounds: Normal breath sounds. No wheezing. Abdominal:      General: Bowel sounds are normal. There is no distension. Palpations: Abdomen is soft. Tenderness: There is no abdominal tenderness. Musculoskeletal:         General: Normal range of motion. Cervical back: Normal range of motion. Right lower leg: No edema. Left lower leg: No edema. Skin:     General: Skin is warm. Neurological:      General: No focal deficit present. Mental Status: She is alert and oriented to person, place, and time. Cranial Nerves: No cranial nerve deficit. Motor: No weakness. Gait: Gait normal.   Psychiatric:         Mood and Affect: Mood normal.         Behavior: Behavior normal.                Results  Results for orders placed or performed during the hospital encounter of 33/38/73   METABOLIC PANEL, BASIC   Result Value Ref Range    Sodium 141 136 - 145 mmol/L    Potassium 4.2 3.5 - 5.5 mmol/L    Chloride 109 100 - 111 mmol/L    CO2 26 21 - 32 mmol/L    Anion gap 6 3.0 - 18 mmol/L    Glucose 100 (H) 74 - 99 mg/dL    BUN 17 7.0 - 18 MG/DL    Creatinine 1.40 (H) 0.6 - 1.3 MG/DL    BUN/Creatinine ratio 12 12 - 20      GFR est AA 44 (L) >60 ml/min/1.73m2    GFR est non-AA 37 (L) >60 ml/min/1.73m2    Calcium 9.2 8.5 - 10.1 MG/DL   LIPID PANEL   Result Value Ref Range    LIPID PROFILE          Cholesterol, total 183 <200 MG/DL    Triglyceride 125 <150 MG/DL    HDL Cholesterol 65 (H) 40 - 60 MG/DL    LDL, calculated 93 0 - 100 MG/DL    VLDL, calculated 25 MG/DL    CHOL/HDL Ratio 2.8 0 - 5.0         ASSESSMENT and PLAN  1. CKD stage G3b/A1, GFR 30-44 and albumin creatinine ratio <30 mg/g ([facility])  - REFERRAL TO NEPHROLOGY  - PTH INTACT;  Future  - VITAMIN D, 25 HYDROXY; Future           Follow-up and Dispositions    · Return in about 6 months (around 9/11/2022), or if symptoms worsen or fail to improve, for follow up chronic conditions 3-6 months. I have discussed the diagnosis with the patient and the intended plan of care as seen in the above orders. The patient has received an after-visit summary and questions were answered concerning future plans. I have discussed medication, side effects, and warnings with the patient in detail. The patient verbalized understanding and is in agreement with the plan of care. The patient will contact the office with any additional concerns. I spent at least 30 minutes on this visit with this established patient. Sandrine Odonnell MD    PLEASE NOTE:   This document has been produced using voice recognition software. Unrecognized errors in transcription may be presentThis is the Subsequent Medicare Annual Wellness Exam, performed 12 months or more after the Initial AWV or the last Subsequent AWV    I have reviewed the patient's medical history in detail and updated the computerized patient record. Assessment/Plan   Education and counseling provided:  Are appropriate based on today's review and evaluation    1.  Medicare annual wellness visit, subsequent  -Immunizations: declined at this time  -colon cancer screening: up to date  -Breast cancer screening: up to date  -PAP smear: not advised for this age group  -DEXA scan: up to date  -Routine labs: completed and within normal limits except kidney function which shows that it has declined some and we will be preemptive and establish care with nephrology and check PTH and vitmain D levels  -Eye Exam: advised yearly eye exam  -counseled about diet rich in green leafy vegetables and protein, less carbs /exercise at least 150 minutes a week/weight loss  -Advanced directive: completed and patient would like attempt to be made to resuscitate her however if it looks like there is no chance of recovery she does not want to be intubated or ventilated and would like to be in the hospital for comfort care. 2. ACP (advance care planning) completed and patient would like attempt to be made to resuscitate her however if it looks like there is no chance of recovery she does not want to be intubated or ventilated and would like to be in the hospital for comfort care. 3. Screening for depression  negative       Depression Risk Factor Screening     3 most recent PHQ Screens 3/11/2022   Little interest or pleasure in doing things Not at all   Feeling down, depressed, irritable, or hopeless Not at all   Total Score PHQ 2 0   Trouble falling or staying asleep, or sleeping too much Not at all   Feeling tired or having little energy Not at all   Poor appetite, weight loss, or overeating Not at all   Feeling bad about yourself - or that you are a failure or have let yourself or your family down Not at all   Trouble concentrating on things such as school, work, reading, or watching TV Not at all   Moving or speaking so slowly that other people could have noticed; or the opposite being so fidgety that others notice Not at all   Thoughts of being better off dead, or hurting yourself in some way Not at all   PHQ 9 Score 0   How difficult have these problems made it for you to do your work, take care of your home and get along with others Not difficult at all       Alcohol & Drug Abuse Risk Screen    Do you average more than 1 drink per night or more than 7 drinks a week:  No    On any one occasion in the past three months have you have had more than 3 drinks containing alcohol:  No          Functional Ability and Level of Safety    Hearing: Hearing is good. Activities of Daily Living: The home contains: handrails, grab bars and rugs  Patient does total self care      Ambulation: with mild difficulty     Fall Risk:  Fall Risk Assessment, last 12 mths 3/11/2022   Able to walk? Yes   Fall in past 12 months? 1   Do you feel unsteady?  1   Are you worried about falling 1   Is TUG test greater than 12 seconds? 0   Is the gait abnormal? 1   Number of falls in past 12 months 2   Fall with injury? 1      Abuse Screen:  Patient is not abused       Cognitive Screening    Has your family/caregiver stated any concerns about your memory: no     Cognitive Screening: Normal - Clock Drawing Test    Health Maintenance Due     Health Maintenance Due   Topic Date Due    DTaP/Tdap/Td series (1 - Tdap) Never done    Shingrix Vaccine Age 50> (1 of 2) Never done    Medicare Yearly Exam  04/09/2022       Patient Care Team   Patient Care Team:  Jessica Conti MD as PCP - General (Family Medicine)  Jessica Conti MD as PCP - [facility] Provider    History     Patient Active Problem List   Diagnosis Code    Essential hypertension I10    Positive PPD R76.11    Diverticulitis K57.92    Hematuria R31.9    GI bleeding K92.2    COLUMBA (acute kidney injury) ([facility] [address] N17.9    AMS (altered mental status) R41.82    Cholelithiases K80.20    Acute congestive heart failure ([address] I50.9    Syncope R55    Palpitations R00.2    Pneumonia J18.9    Suspected COVID-19 virus infection Z20.822    Severe obesity (BMI 35.0-39. 9) with comorbidity ([address] E66.01     Past Medical History:   Diagnosis Date    Asthma     COVID-19     Diverticulitis     Hematuria     Hypertension     Positive PPD     Sinusitis     Syncope     Vertigo       Past Surgical History:   Procedure Laterality Date    ENDOSCOPY, COLON, DIAGNOSTIC  01/29/2008    HX GYN      Fibroid Tumorectomy    HX KNEE ARTHROSCOPY  5/26/10    R knee     Current Outpatient Medications   Medication Sig Dispense Refill    Xarelto 20 mg tab tablet Take 20 mg by mouth daily.       spironolactone (ALDACTONE) 25 mg tablet TAKE 1 TABLET BY MOUTH EVERY DAY 30 Tablet 1    hydroCHLOROthiazide (HYDRODIURIL) 25 mg tablet TAKE 1 TABLET BY MOUTH EVERY DAY 30 Tablet 1    cloNIDine HCL (CATAPRES) 0.2 mg tablet TAKE 1 TABLET BY MOUTH TWO (2) TIMES A DAY. 180 Tablet 1    amLODIPine (NORVASC) 2.5 mg tablet TAKE 1 TABLET BY MOUTH EVERY DAY 90 Tablet 2    albuterol (PROVENTIL HFA, VENTOLIN HFA, PROAIR HFA) 90 mcg/actuation inhaler 1 OR 2 PUFFS EVERY 4 HRS AS NEEDED      ammonium lactate (LAC-HYDRIN) 12 % lotion DIME  SIZED AMOUTN TWICE A DAY TO LEGS 90 DAYS      Comp Stocking,Knee,Regular,Sml misc 2 Box by Does Not Apply route daily.  Use while upright  Indications: edema (Patient not taking: Reported on 3/11/2022) 2 Each 0     No Known Allergies    Family History   Problem Relation Age of Onset    Cancer Mother     Hypertension Father     Stroke Father     Cancer Sister      Social History     Tobacco Use    Smoking status: Never Smoker    Smokeless tobacco: Never Used   Substance Use Topics    Alcohol use: No         Grazyna Mckinnon

## 2022-03-11 NOTE — PATIENT INSTRUCTIONS

## 2022-03-11 NOTE — ACP (ADVANCE CARE PLANNING)
Advance Care Planning     Advance Care Planning (ACP) Physician/NP/PA Conversation      Date of Conversation: 3/11/2022  Conducted with: Patient with Decision Making Capacity    Healthcare Decision Maker:     Primary Decision Maker: Kristi Gómez - [phone]    Secondary Decision Maker: Gabe Saul - [phone]  Click here to complete [address] including selection of the Healthcare Decision Maker Relationship (ie \"Primary\")        Today we documented Decision Maker(s) consistent with Legal Next of Kin hierarchy. Care Preferences:    Hospitalization: \"If your health worsens and it becomes clear that your chance of recovery is unlikely, what would be your preference regarding hospitalization? \"  The patient would prefer hospitalization. Ventilation: \"If you were unable to breathe on your own and your chance of recovery was unlikely, what would be your preference about the use of a ventilator (breathing machine) if it was available to you? \"   The patient would NOT desire the use of a ventilator. Resuscitation: \"In the event your heart stopped as a result of an underlying serious health condition, would you want attempts to be made to restart your heart, or would you prefer a natural death? \"   Yes, attempt to resuscitate. Additional topics discussed: treatment goals    Conversation Outcomes / Follow-Up Plan:   ACP in process - information provided, considering goals and options  Patient would like resucitation attempted but does not want to be intubated if there is no signs of recovery.   Reviewed DNR/DNI and patient elects Full Code (Attempt Resuscitation)     Length of Voluntary ACP Conversation in minutes:  12 minutes    Marin Truong MD

## 2022-03-18 PROBLEM — N17.9 AKI (ACUTE KIDNEY INJURY) (HCC): Status: ACTIVE | Noted: 2021-01-28

## 2022-03-18 PROBLEM — R00.2 PALPITATIONS: Status: ACTIVE | Noted: 2021-04-08

## 2022-03-18 PROBLEM — J18.9 PNEUMONIA: Status: ACTIVE | Noted: 2021-01-28

## 2022-03-19 PROBLEM — E66.01 SEVERE OBESITY (BMI 35.0-39.9) WITH COMORBIDITY (HCC): Status: ACTIVE | Noted: 2021-05-10

## 2022-03-19 PROBLEM — Z20.822 SUSPECTED COVID-19 VIRUS INFECTION: Status: ACTIVE | Noted: 2021-01-28

## 2022-03-19 PROBLEM — R41.82 AMS (ALTERED MENTAL STATUS): Status: ACTIVE | Noted: 2021-01-28

## 2022-03-19 PROBLEM — I50.9 ACUTE CONGESTIVE HEART FAILURE (HCC): Status: ACTIVE | Noted: 2021-04-08

## 2022-03-19 PROBLEM — R55 SYNCOPE: Status: ACTIVE | Noted: 2021-04-08

## 2022-04-13 DIAGNOSIS — I50.32 CHRONIC DIASTOLIC CONGESTIVE HEART FAILURE (HCC): ICD-10-CM

## 2022-04-13 NOTE — TELEPHONE ENCOUNTER
Requested Prescriptions     Pending Prescriptions Disp Refills    Comp Stocking,Knee,Regular,Sml misc 2 Each 0     Si Box by Does Not Apply route daily.  Use while upright  Indications: edema

## 2022-04-14 ENCOUNTER — TELEPHONE (OUTPATIENT)
Dept: FAMILY MEDICINE CLINIC | Age: 77
End: 2022-04-14

## 2022-04-14 DIAGNOSIS — N25.81 SECONDARY HYPERPARATHYROIDISM (HCC): ICD-10-CM

## 2022-04-14 DIAGNOSIS — E55.9 VITAMIN D DEFICIENCY: Primary | ICD-10-CM

## 2022-04-14 DIAGNOSIS — N18.32 STAGE 3B CHRONIC KIDNEY DISEASE (HCC): ICD-10-CM

## 2022-04-14 RX ORDER — ERGOCALCIFEROL 1.25 MG/1
50000 CAPSULE ORAL
Qty: 12 CAPSULE | Refills: 0 | Status: SHIPPED | OUTPATIENT
Start: 2022-04-14 | End: 2022-06-30

## 2022-04-14 NOTE — TELEPHONE ENCOUNTER
To declining kidney function with a GFR of 40 and secondary hyperparathyroidism along with Vitamin D deficiency I will refer patient to nephrology for further evaluation and start patient on ergocalciferol 50,000 units for the next 12 weeks and then switch to maintenance therapy of 2000 units daily.      Vitamin D level of 8.5  PTH: 70

## 2022-06-03 DIAGNOSIS — Z13.228 SCREENING FOR ENDOCRINE, METABOLIC AND IMMUNITY DISORDER: ICD-10-CM

## 2022-06-03 DIAGNOSIS — Z13.29 SCREENING FOR ENDOCRINE, METABOLIC AND IMMUNITY DISORDER: ICD-10-CM

## 2022-06-03 DIAGNOSIS — Z13.0 SCREENING FOR ENDOCRINE, METABOLIC AND IMMUNITY DISORDER: ICD-10-CM

## 2022-06-03 DIAGNOSIS — Z13.6 SCREENING FOR CARDIOVASCULAR CONDITION: ICD-10-CM

## 2022-06-20 DIAGNOSIS — N18.32 CKD STAGE G3B/A1, GFR 30-44 AND ALBUMIN CREATININE RATIO <30 MG/G (HCC): ICD-10-CM

## 2022-06-30 DIAGNOSIS — E55.9 VITAMIN D DEFICIENCY: Primary | ICD-10-CM

## 2022-06-30 RX ORDER — ACETAMINOPHEN 500 MG
2000 TABLET ORAL DAILY
Qty: 90 CAPSULE | Refills: 2 | Status: SHIPPED | OUTPATIENT
Start: 2022-06-30 | End: 2022-09-28

## 2022-06-30 NOTE — TELEPHONE ENCOUNTER
Patient requested Vitamin D 50,000 Units once a week but it has already been three months treatment was prescribed so I will now switch patient to 2000 Units daily for maintenance therapy.

## 2022-07-21 DIAGNOSIS — I50.9 ACUTE CONGESTIVE HEART FAILURE, UNSPECIFIED HEART FAILURE TYPE (HCC): ICD-10-CM

## 2022-07-21 RX ORDER — HYDROCHLOROTHIAZIDE 25 MG/1
TABLET ORAL
Qty: 90 TABLET | Refills: 1 | Status: SHIPPED | OUTPATIENT
Start: 2022-07-21

## 2022-07-21 RX ORDER — SPIRONOLACTONE 25 MG/1
TABLET ORAL
Qty: 90 TABLET | Refills: 1 | Status: SHIPPED | OUTPATIENT
Start: 2022-07-21

## 2022-09-12 ENCOUNTER — OFFICE VISIT (OUTPATIENT)
Dept: CARDIOLOGY CLINIC | Age: 77
End: 2022-09-12
Payer: MEDICARE

## 2022-09-12 VITALS
HEART RATE: 88 BPM | HEIGHT: 63 IN | SYSTOLIC BLOOD PRESSURE: 111 MMHG | WEIGHT: 200 LBS | OXYGEN SATURATION: 99 % | BODY MASS INDEX: 35.44 KG/M2 | DIASTOLIC BLOOD PRESSURE: 64 MMHG

## 2022-09-12 DIAGNOSIS — I50.32 CHRONIC DIASTOLIC CONGESTIVE HEART FAILURE (HCC): Primary | ICD-10-CM

## 2022-09-12 DIAGNOSIS — Z86.718 HISTORY OF DVT (DEEP VEIN THROMBOSIS): ICD-10-CM

## 2022-09-12 DIAGNOSIS — E66.01 SEVERE OBESITY (BMI 35.0-39.9) WITH COMORBIDITY (HCC): ICD-10-CM

## 2022-09-12 DIAGNOSIS — I10 ESSENTIAL HYPERTENSION: ICD-10-CM

## 2022-09-12 PROCEDURE — G8432 DEP SCR NOT DOC, RNG: HCPCS | Performed by: INTERNAL MEDICINE

## 2022-09-12 PROCEDURE — G8536 NO DOC ELDER MAL SCRN: HCPCS | Performed by: INTERNAL MEDICINE

## 2022-09-12 PROCEDURE — G8752 SYS BP LESS 140: HCPCS | Performed by: INTERNAL MEDICINE

## 2022-09-12 PROCEDURE — 99214 OFFICE O/P EST MOD 30 MIN: CPT | Performed by: INTERNAL MEDICINE

## 2022-09-12 PROCEDURE — 1123F ACP DISCUSS/DSCN MKR DOCD: CPT | Performed by: INTERNAL MEDICINE

## 2022-09-12 PROCEDURE — 1090F PRES/ABSN URINE INCON ASSESS: CPT | Performed by: INTERNAL MEDICINE

## 2022-09-12 PROCEDURE — G8427 DOCREV CUR MEDS BY ELIG CLIN: HCPCS | Performed by: INTERNAL MEDICINE

## 2022-09-12 PROCEDURE — G8399 PT W/DXA RESULTS DOCUMENT: HCPCS | Performed by: INTERNAL MEDICINE

## 2022-09-12 PROCEDURE — 1101F PT FALLS ASSESS-DOCD LE1/YR: CPT | Performed by: INTERNAL MEDICINE

## 2022-09-12 PROCEDURE — G8754 DIAS BP LESS 90: HCPCS | Performed by: INTERNAL MEDICINE

## 2022-09-12 PROCEDURE — G8417 CALC BMI ABV UP PARAM F/U: HCPCS | Performed by: INTERNAL MEDICINE

## 2022-09-12 NOTE — PATIENT INSTRUCTIONS
There are no discontinued medications. A Healthy Heart: Care Instructions  Your Care Instructions     Coronary artery disease, also called heart disease, occurs when a substance called plaque builds up in the vessels that supply oxygen-rich blood to your heart muscle. This can narrow the blood vessels and reduce blood flow. A heart attack happens when blood flow is completely blocked. A high-fat diet, smoking, and other factors increase the risk of heart disease. Your doctor has found that you have a chance of having heart disease. You can do lots of things to keep your heart healthy. It may not be easy, but you can change your diet, exercise more, and quit smoking. These steps really work to lower your chance of heart disease. Follow-up care is a key part of your treatment and safety. Be sure to make and go to all appointments, and call your doctor if you are having problems. It's also a good idea to know your test results and keep a list of the medicines you take. How can you care for yourself at home? Diet    Use less salt when you cook and eat. This helps lower your blood pressure. Taste food before salting. Add only a little salt when you think you need it. With time, your taste buds will adjust to less salt. Eat fewer snack items, fast foods, canned soups, and other high-salt, high-fat, processed foods. Read food labels and try to avoid saturated and trans fats. They increase your risk of heart disease by raising cholesterol levels. Limit the amount of solid fat-butter, margarine, and shortening-you eat. Use olive, peanut, or canola oil when you cook. Bake, broil, and steam foods instead of frying them. Eat a variety of fruit and vegetables every day. Dark green, deep orange, red, or yellow fruits and vegetables are especially good for you. Examples include spinach, carrots, peaches, and berries.      Foods high in fiber can reduce your cholesterol and provide important vitamins and minerals. High-fiber foods include whole-grain cereals and breads, oatmeal, beans, brown rice, citrus fruits, and apples. Eat lean proteins. Heart-healthy proteins include seafood, lean meats and poultry, eggs, beans, peas, nuts, seeds, and soy products. Limit drinks and foods with added sugar. These include candy, desserts, and soda pop. Lifestyle changes    If your doctor recommends it, get more exercise. Walking is a good choice. Bit by bit, increase the amount you walk every day. Try for at least 30 minutes on most days of the week. You also may want to swim, bike, or do other activities. Do not smoke. If you need help quitting, talk to your doctor about stop-smoking programs and medicines. These can increase your chances of quitting for good. Quitting smoking may be the most important step you can take to protect your heart. It is never too late to quit. Limit alcohol to 2 drinks a day for men and 1 drink a day for women. Too much alcohol can cause health problems. Manage other health problems such as diabetes, high blood pressure, and high cholesterol. If you think you may have a problem with alcohol or drug use, talk to your doctor. Medicines    Take your medicines exactly as prescribed. Call your doctor if you think you are having a problem with your medicine. If your doctor recommends aspirin, take the amount directed each day. Make sure you take aspirin and not another kind of pain reliever, such as acetaminophen (Tylenol). When should you call for help? Call 911 if you have symptoms of a heart attack. These may include:    Chest pain or pressure, or a strange feeling in the chest.     Sweating. Shortness of breath. Pain, pressure, or a strange feeling in the back, neck, jaw, or upper belly or in one or both shoulders or arms. Lightheadedness or sudden weakness. A fast or irregular heartbeat.    After you call 911, the  may tell you to chew 1 adult-strength or 2 to 4 low-dose aspirin. Wait for an ambulance. Do not try to drive yourself. Watch closely for changes in your health, and be sure to contact your doctor if you have any problems. Where can you learn more? Go to http://www.aparicio.com/  Enter F075 in the search box to learn more about \"A Healthy Heart: Care Instructions. \"  Current as of: January 10, 2022               Content Version: 13.2  © 2006-2022 [employer]. Care instructions adapted under license by [employer] (which disclaims liability or warranty for this information). If you have questions about a medical condition or this instruction, always ask your healthcare professional. [address] any warranty or liability for your use of this information.

## 2022-09-12 NOTE — PROGRESS NOTES
HISTORY OF PRESENT ILLNESS  Shaye Cortez is a 68 y.o. female. Follow-up of CHF, hypertension, obesity    3/19/2021 seen for hypertension but also has other symptoms as noted below. Episode of syncope a couple of weeks ago in the tub when she fell while taking a shower. She thinks she lost her consciousness completely. Denies any aura or dizziness prior to that. Did not have any palpitations prior to that. Upon waking up, she felt completely normal immediately. Follow-up  Pertinent negatives include no chest pain, no headaches and no shortness of breath. Shortness of Breath  The history is provided by the Patient. This is a new problem. The problem occurs intermittently. The current episode started more than 1 week ago. The problem has been resolved. Associated symptoms include leg swelling. Pertinent negatives include no fever, no headaches, no cough, no wheezing, no PND, no orthopnea, no chest pain, no vomiting, no rash and no claudication. The problem's precipitants include exercise (house chores). Leg Swelling  The history is provided by the Patient. This is a new problem. The current episode started more than 1 week ago (yrs). The problem occurs daily. The problem has not changed since onset. Pertinent negatives include no chest pain, no headaches and no shortness of breath. The symptoms are aggravated by standing.    No Known Allergies    Past Medical History:   Diagnosis Date    Asthma     COVID-19     Diverticulitis     Hematuria     Hypertension     Positive PPD     Sinusitis     Syncope     Vertigo        Family History   Problem Relation Age of Onset    Cancer Mother     Hypertension Father     Stroke Father     Cancer Sister        Social History     Tobacco Use    Smoking status: Never    Smokeless tobacco: Never   Vaping Use    Vaping Use: Never used   Substance Use Topics    Alcohol use: No    Drug use: Never        Current Outpatient Medications   Medication Sig    spironolactone (ALDACTONE) 25 mg tablet TAKE 1 TABLET BY MOUTH EVERY DAY    hydroCHLOROthiazide (HYDRODIURIL) 25 mg tablet TAKE 1 TABLET BY MOUTH EVERY DAY    Comp Stocking,Knee,Regular,Sml misc 2 Box by Does Not Apply route daily. Use while upright  Indications: edema    Xarelto 20 mg tab tablet Take 20 mg by mouth daily.  cloNIDine HCL (CATAPRES) 0.2 mg tablet TAKE 1 TABLET BY MOUTH TWO (2) TIMES A DAY.  amLODIPine (NORVASC) 2.5 mg tablet TAKE 1 TABLET BY MOUTH EVERY DAY    albuterol (PROVENTIL HFA, VENTOLIN HFA, PROAIR HFA) 90 mcg/actuation inhaler 1 OR 2 PUFFS EVERY 4 HRS AS NEEDED    cholecalciferol (VITAMIN D3) (2,000 UNITS /50 MCG) cap capsule Take 1 Capsule by mouth daily for 90 days.  ammonium lactate (LAC-HYDRIN) 12 % lotion DIME  SIZED AMOUTN TWICE A DAY TO LEGS 90 DAYS     No current facility-administered medications for this visit. Past Surgical History:   Procedure Laterality Date    ENDOSCOPY, COLON, DIAGNOSTIC  01/29/2008    HX GYN      Fibroid Tumorectomy    HX KNEE ARTHROSCOPY  5/26/10    R knee       Visit Vitals  /64 (BP 1 Location: Left upper arm, BP Patient Position: Sitting, BP Cuff Size: Adult)   Pulse 88   Ht 5' 3\" (1.6 m)   Wt 90.7 kg (200 lb)   SpO2 99%   BMI 35.43 kg/m²       Diagnostic Studies:  I have reviewed the relevant tests done on the patient and show as follows  EKG tracings reviewed by me today. EKG Results       None          XR Results (most recent):  Results from Hospital Encounter encounter on 06/11/15    XR CHEST PA LAT    Narrative  PA and lateral Chest    Indication:  Shortness of breath    Comparison:  May 14 2010    Findings: The cardiomediastinal silhouette is normal.  The lungs are clear showing no  masses, infiltrates or effusions. There is no vascular congestion. Thoracic  spondylosis is evident. Impression  :    No active disease    Ms. Mya Cueto has a reminder for a \"due or due soon\" health maintenance.  I have asked that she contact her primary care provider for follow-up on this health maintenance. Review of Systems   Constitutional:  Negative for chills, fever, malaise/fatigue and weight loss. HENT:  Negative for nosebleeds. Eyes:  Negative for discharge. Respiratory:  Negative for cough, shortness of breath and wheezing. Cardiovascular:  Positive for leg swelling. Negative for chest pain, palpitations, orthopnea, claudication and PND. Gastrointestinal:  Negative for diarrhea, nausea and vomiting. Genitourinary:  Negative for dysuria and hematuria. Musculoskeletal:  Negative for joint pain. Skin:  Negative for rash. Neurological:  Negative for dizziness, seizures, loss of consciousness and headaches. Endo/Heme/Allergies:  Negative for polydipsia. Does not bruise/bleed easily. Psychiatric/Behavioral:  Negative for depression and substance abuse. The patient does not have insomnia.    03/25/21   ECHO ADULT COMPLETE 03/25/2021 3/25/2021    Narrative · LV: Estimated LVEF is 65 - 70%. Visually measured ejection fraction. Normal cavity size and systolic function (ejection fraction normal). Upper   normal wall thickness. Wall motion: normal. Mild (grade 1) left   ventricular diastolic dysfunction. · PA: Pulmonary arterial systolic pressure is 25 mmHg. · Mild hypertrophy of the septum. Signed by: Rika Anderson MD     05/13/21   NUCLEAR CARDIAC STRESS TEST 05/13/2021 5/13/2021    Narrative · Baseline ECG: Sinus bradycardia. · Stress test: Negative stress test.  · SPECT: Left ventricular function post-stress was normal. Calculated   ejection fraction is 90%. There is no evidence of transient ischemic   dilation (TID). The TID ratio is 0.98.  · SPECT: Left ventricular perfusion is normal. Myocardial perfusion   imaging supports a low risk stress test.        Signed by: Rika Anderson MD     Physical Exam  Constitutional:       General: She is not in acute distress.      Appearance: She is well-developed. She is obese. Comments: Sev obese   HENT:      Head: Normocephalic and atraumatic. Mouth/Throat:      Dentition: Normal dentition. Eyes:      General: No scleral icterus. Right eye: No discharge. Left eye: No discharge. Neck:      Thyroid: No thyromegaly. Vascular: Hepatojugular reflux and JVD present. No carotid bruit. Cardiovascular:      Rate and Rhythm: Normal rate and regular rhythm. Pulses: Intact distal pulses. Heart sounds: Normal heart sounds, S1 normal and S2 normal. No murmur heard. No friction rub. No gallop. Pulmonary:      Effort: Pulmonary effort is normal.      Breath sounds: Normal breath sounds. No wheezing or rales. Abdominal:      Palpations: Abdomen is soft. There is no mass. Tenderness: There is no abdominal tenderness. Musculoskeletal:      Cervical back: Neck supple. Right lower leg: Edema (Trace) present. Left lower leg: Edema (Trace) present. Lymphadenopathy:      Cervical:      Right cervical: No superficial cervical adenopathy. Left cervical: No superficial cervical adenopathy. Skin:     General: Skin is warm and dry. Findings: No rash. Neurological:      Mental Status: She is alert and oriented to person, place, and time. Psychiatric:         Behavior: Behavior normal.       ASSESSMENT and PLAN    LIPIDS : Results for Micael Dubin (MRN [id number]) as of 3/3/2022 14:06   Ref.  Range 4/1/2021 09:09 4/1/2021 09:09 5/14/2021 10:40 9/2/2021 09:00   Triglyceride Latest Ref Range: <150 MG/ (H) 151 (H)  125   Cholesterol, total Latest Ref Range: <200 MG/ (H) 201 (H)  183   HDL Cholesterol Latest Ref Range: 40 - 60 MG/DL 73 (H) 73 (H)  65 (H)   CHOL/HDL Ratio Latest Ref Range: 0 - 5.0   2.9 2.8  2.8   VLDL, calculated Latest Units: MG/DL 31.2 30.2  25   LDL, calculated Latest Ref Range: 0 - 100 MG/.8 (H) 97.8  93     History of DVT: Right leg 1/22;    3/19/2021   Has acute CHF of unclear etiology. Check echo. As CHF improves will need CAD work-up also  Had syncope and is undergoing neurology work-up but will get a mobile telemetry. Increase HCTZ for hypertension and CHF. Follow-up labs in 2 weeks also. She is trying to lose weight. Mediterranean diet guidelines printed. 5/10/2021 CHF is persisting. Echo has shown normal ejection fraction mild LVH. Check a stress test to evaluate ischemia. Add Aldactone and follow-up electrolytes closely. She did not get mobile telemetry equipment and has not had any episodes or dizziness in the last 6 weeks. Will wait on the telemetry for now. Blood pressure is mildly elevated and will hopefully reduce as we give more aggressive diuresis. Discussed diet in detail specially sodium intake. 5/13/2021 CHF is improving clinically less shortness of breath and edema since Aldactone started. Follow electrolytes closely and adjust potassium as needed. She is trying to follow the diet. Stress test has not shown any ischemia. Continue medical treatment for CHF and hypertension. BP mildly elevated but home BPs are fine and follow the home chart. Diet exercise reinforced again. 8/19/2021 CHF is chronic. NYHA class III. Blood pressure is controlled  Has not lost any significant weight but has gained a few pounds. Diet weight exercise reinforced, encouraged and discussed in detail. Labs as ordered including lipids. 3/3/2022 CHF is stable and compensated with mild edema. NYHA class II. Blood pressure is controlled. Diet weight and exercise discussed. She is trying to lose weight and has successfully lost 12 pounds since last visit. She was found to have DVT in the right leg and Xarelto was started by vascular. Diagnoses and all orders for this visit:    1. Chronic diastolic congestive heart failure ([address]    2. Essential hypertension    3. Severe obesity (BMI 35.0-39. 9) with comorbidity ([address]    4.  History of DVT (deep vein thrombosis)        Pertinent laboratory and test data reviewed and discussed with patient. See patient instructions also for other medical advice given    There are no discontinued medications. Follow-up and Dispositions    Return in about 1 year (around 9/12/2023), or if symptoms worsen or fail to improve, for post test.       9/12/2022 CHF is compensated. Blood pressure is controlled  She is not much active due to her knee issues. I recommended that she try a stationary bike and she seemed to agree to the idea. She continues with the Xarelto for DVT.

## 2022-09-12 NOTE — PROGRESS NOTES
1. Have you been to the ER, urgent care clinic since your last visit? Hospitalized since your last visit? Yes , [facility] for bladder infection    2. Have you seen or consulted any other health care providers outside of the [address] [place] [address] since your last visit? Include any pap smears or colon screening. No     3. Since your last visit, have you had any of the following symptoms?      swelling in legs/arms. 4.  Have you had any blood work, X-rays or cardiac testing? No     Requested: NO     In [facility]: NO    5. Where do you normally have your labs drawn? [facility]    6. Do you need any refills today?    No

## 2022-10-08 DIAGNOSIS — I10 ESSENTIAL HYPERTENSION: ICD-10-CM

## 2022-10-10 RX ORDER — CLONIDINE HYDROCHLORIDE 0.2 MG/1
TABLET ORAL
Qty: 180 TABLET | Refills: 1 | Status: SHIPPED | OUTPATIENT
Start: 2022-10-10

## 2022-10-20 RX ORDER — AMLODIPINE BESYLATE 2.5 MG/1
TABLET ORAL
Qty: 90 TABLET | Refills: 2 | Status: SHIPPED | OUTPATIENT
Start: 2022-10-20

## 2023-01-19 DIAGNOSIS — I50.9 ACUTE CONGESTIVE HEART FAILURE, UNSPECIFIED HEART FAILURE TYPE (HCC): ICD-10-CM

## 2023-01-23 RX ORDER — SPIRONOLACTONE 25 MG/1
TABLET ORAL
Qty: 90 TABLET | Refills: 1 | Status: SHIPPED | OUTPATIENT
Start: 2023-01-23

## 2023-01-24 ENCOUNTER — TELEPHONE (OUTPATIENT)
Dept: CARDIOLOGY CLINIC | Age: 78
End: 2023-01-24

## 2023-01-24 DIAGNOSIS — I50.32 CHRONIC DIASTOLIC CONGESTIVE HEART FAILURE (HCC): Primary | ICD-10-CM

## 2023-01-24 LAB
ANION GAP SERPL CALC-SCNC: 12 MMOL/L (ref 3–15)
BUN SERPL-MCNC: 24 MG/DL (ref 6–22)
CALCIUM SERPL-MCNC: 9.3 MG/DL (ref 8.4–10.5)
CHLORIDE SERPL-SCNC: 101 MMOL/L (ref 98–110)
CO2 SERPL-SCNC: 24 MMOL/L (ref 20–32)
CREAT SERPL-MCNC: 1.7 MG/DL (ref 0.8–1.4)
GLOMERULAR FILTRATION RATE: 30.3 ML/MIN/1.73 SQ.M.
GLUCOSE SERPL-MCNC: 83 MG/DL (ref 70–99)
POTASSIUM SERPL-SCNC: 4.4 MMOL/L (ref 3.5–5.5)
SODIUM SERPL-SCNC: 137 MMOL/L (ref 133–145)

## 2023-01-24 NOTE — TELEPHONE ENCOUNTER
Pt. Understands lab for BMP is needed prior to renewing spironolactone. Pt. Will  lab order to take & complete at [facility].     ---------------------------------------------------------        Please get a BMP before I renew spironolactone.   I ordered

## 2023-01-24 NOTE — PROGRESS NOTES
Please contact patient and do the following asap  Reduce HCTZ to 25 mg every other day  Rpt BMP  in 30 days

## 2023-01-24 NOTE — TELEPHONE ENCOUNTER
Patient called and stated that she was told to let the office know when she got her lab work done and she got it done today 01/24/2023

## 2023-01-25 ENCOUNTER — TELEPHONE (OUTPATIENT)
Dept: CARDIOLOGY CLINIC | Age: 78
End: 2023-01-25

## 2023-01-25 NOTE — TELEPHONE ENCOUNTER
Patient made aware of message below.      Valeria Mcdonald MD  [facility] Nurse  Please contact patient and do the following asap   Reduce HCTZ to 25 mg every other day   Rpt BMP  in 30 days

## 2023-01-25 NOTE — TELEPHONE ENCOUNTER
----- Message from Cal Zaldivar MD sent at 1/24/2023  3:30 PM EST -----  Please contact patient and do the following asap  Reduce HCTZ to 25 mg every other day  Rpt BMP  in 30 days

## 2023-01-25 NOTE — TELEPHONE ENCOUNTER
----- Message from Jasmyn Shaffer MD sent at 1/24/2023  3:30 PM EST -----  Please contact patient and do the following asap  Reduce HCTZ to 25 mg every other day  Rpt BMP  in 30 days

## 2023-02-01 DIAGNOSIS — I10 ESSENTIAL HYPERTENSION: ICD-10-CM

## 2023-02-01 DIAGNOSIS — I50.9 ACUTE CONGESTIVE HEART FAILURE, UNSPECIFIED HEART FAILURE TYPE (HCC): ICD-10-CM

## 2023-02-01 RX ORDER — CLONIDINE HYDROCHLORIDE 0.2 MG/1
0.2 TABLET ORAL 2 TIMES DAILY
Qty: 180 TABLET | Refills: 3 | Status: SHIPPED | OUTPATIENT
Start: 2023-02-01

## 2023-02-01 RX ORDER — SPIRONOLACTONE 25 MG/1
25 TABLET ORAL DAILY
Qty: 90 TABLET | Refills: 3 | Status: SHIPPED | OUTPATIENT
Start: 2023-02-01

## 2023-02-01 RX ORDER — HYDROCHLOROTHIAZIDE 25 MG/1
25 TABLET ORAL EVERY OTHER DAY
Qty: 45 TABLET | Refills: 3 | Status: SHIPPED | OUTPATIENT
Start: 2023-02-01

## 2023-02-01 RX ORDER — AMLODIPINE BESYLATE 2.5 MG/1
2.5 TABLET ORAL DAILY
Qty: 90 TABLET | Refills: 2 | Status: SHIPPED | OUTPATIENT
Start: 2023-02-01

## 2023-02-05 DIAGNOSIS — I50.32 CHRONIC DIASTOLIC CONGESTIVE HEART FAILURE (HCC): Primary | ICD-10-CM

## 2023-02-09 DIAGNOSIS — I10 ESSENTIAL HYPERTENSION: ICD-10-CM

## 2023-02-09 DIAGNOSIS — I50.9 ACUTE CONGESTIVE HEART FAILURE, UNSPECIFIED HEART FAILURE TYPE (HCC): ICD-10-CM

## 2023-02-09 RX ORDER — AMLODIPINE BESYLATE 2.5 MG/1
2.5 TABLET ORAL DAILY
Qty: 90 TABLET | Refills: 3 | Status: SHIPPED | OUTPATIENT
Start: 2023-02-09

## 2023-02-09 RX ORDER — HYDROCHLOROTHIAZIDE 25 MG/1
25 TABLET ORAL EVERY OTHER DAY
Qty: 45 TABLET | Refills: 3 | Status: SHIPPED | OUTPATIENT
Start: 2023-02-09

## 2023-02-09 RX ORDER — SPIRONOLACTONE 25 MG/1
25 TABLET ORAL DAILY
Qty: 90 TABLET | Refills: 3 | Status: SHIPPED | OUTPATIENT
Start: 2023-02-09

## 2023-02-09 RX ORDER — CLONIDINE HYDROCHLORIDE 0.2 MG/1
0.2 TABLET ORAL 2 TIMES DAILY
Qty: 180 TABLET | Refills: 3 | Status: SHIPPED | OUTPATIENT
Start: 2023-02-09

## 2024-01-11 DIAGNOSIS — I10 ESSENTIAL (PRIMARY) HYPERTENSION: ICD-10-CM

## 2024-01-11 RX ORDER — CLONIDINE HYDROCHLORIDE 0.2 MG/1
0.2 TABLET ORAL 2 TIMES DAILY
Qty: 180 TABLET | Refills: 3 | OUTPATIENT
Start: 2024-01-11

## 2024-01-11 RX ORDER — SPIRONOLACTONE 25 MG/1
25 TABLET ORAL DAILY
Qty: 90 TABLET | Refills: 3 | OUTPATIENT
Start: 2024-01-11

## 2024-01-11 RX ORDER — AMLODIPINE BESYLATE 2.5 MG/1
2.5 TABLET ORAL DAILY
Qty: 90 TABLET | Refills: 3 | OUTPATIENT
Start: 2024-01-11

## 2024-02-01 ENCOUNTER — OFFICE VISIT (OUTPATIENT)
Age: 79
End: 2024-02-01
Payer: COMMERCIAL

## 2024-02-01 VITALS
WEIGHT: 212 LBS | DIASTOLIC BLOOD PRESSURE: 63 MMHG | SYSTOLIC BLOOD PRESSURE: 111 MMHG | HEART RATE: 65 BPM | BODY MASS INDEX: 37.56 KG/M2 | OXYGEN SATURATION: 98 % | HEIGHT: 63 IN

## 2024-02-01 DIAGNOSIS — E66.01 MORBID (SEVERE) OBESITY DUE TO EXCESS CALORIES (HCC): ICD-10-CM

## 2024-02-01 DIAGNOSIS — Z86.718 HISTORY OF DVT OF LOWER EXTREMITY: ICD-10-CM

## 2024-02-01 DIAGNOSIS — I50.32 CHRONIC DIASTOLIC (CONGESTIVE) HEART FAILURE (HCC): ICD-10-CM

## 2024-02-01 DIAGNOSIS — I10 ESSENTIAL (PRIMARY) HYPERTENSION: ICD-10-CM

## 2024-02-01 DIAGNOSIS — I50.32 CHRONIC DIASTOLIC (CONGESTIVE) HEART FAILURE (HCC): Primary | ICD-10-CM

## 2024-02-01 PROCEDURE — 93000 ELECTROCARDIOGRAM COMPLETE: CPT | Performed by: INTERNAL MEDICINE

## 2024-02-01 PROCEDURE — 3078F DIAST BP <80 MM HG: CPT | Performed by: INTERNAL MEDICINE

## 2024-02-01 PROCEDURE — 99214 OFFICE O/P EST MOD 30 MIN: CPT | Performed by: INTERNAL MEDICINE

## 2024-02-01 PROCEDURE — 3074F SYST BP LT 130 MM HG: CPT | Performed by: INTERNAL MEDICINE

## 2024-02-01 PROCEDURE — 1123F ACP DISCUSS/DSCN MKR DOCD: CPT | Performed by: INTERNAL MEDICINE

## 2024-02-01 RX ORDER — ALLOPURINOL 100 MG/1
100 TABLET ORAL DAILY
COMMUNITY
Start: 2023-11-20

## 2024-02-01 RX ORDER — ERGOCALCIFEROL 1.25 MG/1
50000 CAPSULE ORAL
COMMUNITY
Start: 2024-01-06

## 2024-02-01 RX ORDER — FUROSEMIDE 40 MG/1
40 TABLET ORAL DAILY
COMMUNITY
Start: 2023-11-20

## 2024-02-01 ASSESSMENT — PATIENT HEALTH QUESTIONNAIRE - PHQ9
SUM OF ALL RESPONSES TO PHQ QUESTIONS 1-9: 0
SUM OF ALL RESPONSES TO PHQ QUESTIONS 1-9: 0
1. LITTLE INTEREST OR PLEASURE IN DOING THINGS: 0
SUM OF ALL RESPONSES TO PHQ9 QUESTIONS 1 & 2: 0
2. FEELING DOWN, DEPRESSED OR HOPELESS: 0
SUM OF ALL RESPONSES TO PHQ QUESTIONS 1-9: 0
SUM OF ALL RESPONSES TO PHQ QUESTIONS 1-9: 0
DEPRESSION UNABLE TO ASSESS: FUNCTIONAL CAPACITY MOTIVATION LIMITS ACCURACY

## 2024-02-01 ASSESSMENT — ENCOUNTER SYMPTOMS
RESPIRATORY NEGATIVE: 1
EYES NEGATIVE: 1
GASTROINTESTINAL NEGATIVE: 1

## 2024-02-01 NOTE — PROGRESS NOTES
1. Have you been to the ER, urgent care clinic since your last visit?  Hospitalized since your last visit?     No      2.  Where do you normally have your labs drawn?   [facility]    3. Do you need any refills today?   No    4. Which local pharmacy do you use (enter pharmacy)?   [facility]    5. Which mail order pharmacy do you use (enter pharmacy)?   [facility]     6. Are you here for surgical clearance and if so who will be doing your     procedure/surgery (care team)?   No

## 2024-02-01 NOTE — PROGRESS NOTES
Augie Wilson is a 78 y.o. year old female.    Follow-up of CHF, hypertension, obesity  History of DVT    3/19/2021 seen for hypertension but also has other symptoms as noted below.  Episode of syncope a couple of weeks ago in the tub when she fell while taking a shower. She thinks she lost her consciousness completely. Denies any aura or dizziness prior to that. Did not have any palpitations prior to that. Upon waking up, she felt completely normal immediately.  2/1/2024 no chest pain palpitations or shortness of breath.  Not much ambulatory because of knee joints issues.  Occasional dizziness but no loss of consciousness.  HCTZ changed to furosemide by nephrology.              Review of Systems   Constitutional: Negative.    HENT: Negative.     Eyes: Negative.    Respiratory: Negative.     Cardiovascular: Negative.    Gastrointestinal: Negative.    Endocrine: Negative.    Genitourinary: Negative.    Musculoskeletal: Negative.    Neurological:  Positive for dizziness.   Psychiatric/Behavioral: Negative.     All other systems reviewed and are negative.        Physical Exam  Vitals and nursing note reviewed.   Constitutional:       Appearance: Normal appearance.   HENT:      Head: Normocephalic and atraumatic.      Nose: Nose normal.   Eyes:      Conjunctiva/sclera: Conjunctivae normal.   Cardiovascular:      Rate and Rhythm: Normal rate and regular rhythm.      Pulses: Normal pulses.      Heart sounds: Normal heart sounds.   Pulmonary:      Effort: Pulmonary effort is normal.      Breath sounds: Normal breath sounds.   Abdominal:      General: Bowel sounds are normal.      Palpations: Abdomen is soft.   Musculoskeletal:         General: Normal range of motion.   Skin:     General: Skin is warm and dry.   Neurological:      General: No focal deficit present.      Mental Status: She is alert and oriented to person, place, and time.   Psychiatric:         Mood and Affect: Mood normal.         Behavior: Behavior

## 2024-02-13 RX ORDER — SPIRONOLACTONE 25 MG/1
25 TABLET ORAL DAILY
Qty: 90 TABLET | Refills: 3 | Status: SHIPPED | OUTPATIENT
Start: 2024-02-13

## 2024-10-14 RX ORDER — SPIRONOLACTONE 25 MG/1
25 TABLET ORAL DAILY
Qty: 100 TABLET | Refills: 3 | Status: SHIPPED | OUTPATIENT
Start: 2024-10-14

## 2025-02-10 ENCOUNTER — OFFICE VISIT (OUTPATIENT)
Age: 80
End: 2025-02-10
Payer: COMMERCIAL

## 2025-02-10 VITALS
HEIGHT: 63 IN | DIASTOLIC BLOOD PRESSURE: 66 MMHG | HEART RATE: 81 BPM | OXYGEN SATURATION: 98 % | WEIGHT: 215 LBS | SYSTOLIC BLOOD PRESSURE: 112 MMHG | BODY MASS INDEX: 38.09 KG/M2

## 2025-02-10 DIAGNOSIS — Z87.19 HISTORY OF GI DIVERTICULAR BLEED: ICD-10-CM

## 2025-02-10 DIAGNOSIS — E66.01 MORBID (SEVERE) OBESITY DUE TO EXCESS CALORIES: ICD-10-CM

## 2025-02-10 DIAGNOSIS — I10 ESSENTIAL (PRIMARY) HYPERTENSION: ICD-10-CM

## 2025-02-10 DIAGNOSIS — I50.32 CHRONIC DIASTOLIC (CONGESTIVE) HEART FAILURE (HCC): Primary | ICD-10-CM

## 2025-02-10 DIAGNOSIS — Z86.718 HISTORY OF DVT OF LOWER EXTREMITY: ICD-10-CM

## 2025-02-10 PROCEDURE — 3074F SYST BP LT 130 MM HG: CPT | Performed by: INTERNAL MEDICINE

## 2025-02-10 PROCEDURE — 3078F DIAST BP <80 MM HG: CPT | Performed by: INTERNAL MEDICINE

## 2025-02-10 PROCEDURE — 99214 OFFICE O/P EST MOD 30 MIN: CPT | Performed by: INTERNAL MEDICINE

## 2025-02-10 PROCEDURE — 1123F ACP DISCUSS/DSCN MKR DOCD: CPT | Performed by: INTERNAL MEDICINE

## 2025-02-10 PROCEDURE — 93000 ELECTROCARDIOGRAM COMPLETE: CPT | Performed by: INTERNAL MEDICINE

## 2025-02-10 RX ORDER — LANOLIN ALCOHOL/MO/W.PET/CERES
1000 CREAM (GRAM) TOPICAL DAILY
COMMUNITY

## 2025-02-10 RX ORDER — CLONIDINE HYDROCHLORIDE 0.1 MG/1
0.1 TABLET ORAL NIGHTLY
Qty: 90 TABLET | Refills: 1 | Status: SHIPPED | OUTPATIENT
Start: 2025-02-10

## 2025-02-10 ASSESSMENT — PATIENT HEALTH QUESTIONNAIRE - PHQ9
SUM OF ALL RESPONSES TO PHQ QUESTIONS 1-9: 0
DEPRESSION UNABLE TO ASSESS: FUNCTIONAL CAPACITY MOTIVATION LIMITS ACCURACY
SUM OF ALL RESPONSES TO PHQ9 QUESTIONS 1 & 2: 0
SUM OF ALL RESPONSES TO PHQ QUESTIONS 1-9: 0
2. FEELING DOWN, DEPRESSED OR HOPELESS: NOT AT ALL
SUM OF ALL RESPONSES TO PHQ QUESTIONS 1-9: 0
SUM OF ALL RESPONSES TO PHQ QUESTIONS 1-9: 0
1. LITTLE INTEREST OR PLEASURE IN DOING THINGS: NOT AT ALL

## 2025-02-10 ASSESSMENT — ENCOUNTER SYMPTOMS
EYES NEGATIVE: 1
SHORTNESS OF BREATH: 1
GASTROINTESTINAL NEGATIVE: 1

## 2025-02-10 NOTE — PROGRESS NOTES
1. Have you been to the ER, urgent care clinic since your last visit?  Hospitalized since your last visit?     Yes, [facility]      2.  Where do you normally have your labs drawn?   [facility]    3. Do you need any refills today?   Yes    4. Which local pharmacy do you use (enter pharmacy)?   [facility]    5. Which mail order pharmacy do you use (enter pharmacy)?   [facility]    6. Are you here for surgical clearance and if so who will be doing your     procedure/surgery (care team)?   No      
NEUROLOGICAL SURGERY  09/2021    cervical fusion    VASCULAR SURGERY  12/2021    stent in stomach       Vitals:    02/10/25 0924   BP: 112/66   Site: Left Upper Arm   Position: Sitting   Cuff Size: Medium Adult   Pulse: 81   SpO2: 98%   Weight: 97.5 kg (215 lb)   Height: 1.6 m (5' 3\")          Diagnostic Studies:  I have reviewed the relevant tests done on the patient and show as follows  EKG tracings reviewed by me today.      Ms. Wilson has a reminder for a \"due or due soon\" health maintenance. I have asked that she contact her primary care provider for follow-up on this health maintenance.         03/25/21   ECHO ADULT COMPLETE 03/25/2021 3/25/2021    Narrative · LV: Estimated LVEF is 65 - 70%. Visually measured ejection fraction.   Normal cavity size and systolic function (ejection fraction normal). Upper   normal wall thickness. Wall motion: normal. Mild (grade 1) left   ventricular diastolic dysfunction.  · PA: Pulmonary arterial systolic pressure is 25 mmHg.  · Mild hypertrophy of the septum.        Signed by: Evonne Clark MD     05/13/21   NUCLEAR CARDIAC STRESS TEST 05/13/2021 5/13/2021    Narrative · Baseline ECG: Sinus bradycardia.  · Stress test: Negative stress test.  · SPECT: Left ventricular function post-stress was normal. Calculated   ejection fraction is 90%. There is no evidence of transient ischemic   dilation (TID).The TID ratio is 0.98.  · SPECT: Left ventricular perfusion is normal. Myocardial perfusion   imaging supports a low risk stress test.        Signed by: Evonne Clark MD           ASSESSMENT & PLAN    Lab Results   Component Value Date    CHOL 183 09/02/2021    CHOL 201 (H) 04/01/2021    CHOL 211 (H) 04/01/2021     Lab Results   Component Value Date    TRIG 125 09/02/2021    TRIG 151 (H) 04/01/2021    TRIG 156 (H) 04/01/2021     Lab Results   Component Value Date    HDL 65 (H) 09/02/2021    HDL 73 (H) 04/01/2021    HDL 73 (H) 04/01/2021     Lab Results   Component Value Date

## 2025-02-25 LAB
A/G RATIO: 1 RATIO (ref 1.1–2.6)
ALBUMIN: 3.8 G/DL (ref 3.5–5)
ALP BLD-CCNC: 109 U/L (ref 40–120)
ALT SERPL-CCNC: 12 U/L (ref 5–40)
AST SERPL-CCNC: 17 U/L (ref 10–37)
BILIRUB SERPL-MCNC: 0.3 MG/DL (ref 0.2–1.2)
BILIRUBIN DIRECT: <0.2 MG/DL (ref 0–0.3)
CHOLESTEROL, TOTAL: 175 MG/DL (ref 110–200)
CHOLESTEROL/HDL RATIO: 2.6 (ref 0–5)
GLOBULIN: 3.8 G/DL (ref 2–4)
HDLC SERPL-MCNC: 67 MG/DL
LDL CHOLESTEROL: 80 MG/DL (ref 50–99)
LDL/HDL RATIO: 1.2
NON-HDL CHOLESTEROL: 108 MG/DL
TOTAL PROTEIN: 7.6 G/DL (ref 6.2–8.1)
TRIGL SERPL-MCNC: 139 MG/DL (ref 40–149)
VLDLC SERPL CALC-MCNC: 28 MG/DL (ref 8–30)

## 2025-04-20 DIAGNOSIS — I10 ESSENTIAL (PRIMARY) HYPERTENSION: ICD-10-CM

## 2025-04-21 RX ORDER — CLONIDINE HYDROCHLORIDE 0.1 MG/1
0.1 TABLET ORAL NIGHTLY
Qty: 100 TABLET | Refills: 2 | Status: SHIPPED | OUTPATIENT
Start: 2025-04-21

## 2025-08-08 ENCOUNTER — TRANSCRIBE ORDERS (OUTPATIENT)
Facility: HOSPITAL | Age: 80
End: 2025-08-08

## 2025-08-08 DIAGNOSIS — Z78.0 POSTMENOPAUSAL: ICD-10-CM

## 2025-08-08 DIAGNOSIS — Z13.820 SCREENING FOR OSTEOPOROSIS: Primary | ICD-10-CM

## 2025-08-28 ENCOUNTER — HOSPITAL ENCOUNTER (OUTPATIENT)
Facility: HOSPITAL | Age: 80
Discharge: HOME | End: 2025-08-31
Payer: COMMERCIAL

## 2025-08-28 DIAGNOSIS — Z13.820 SCREENING FOR OSTEOPOROSIS: ICD-10-CM

## 2025-08-28 DIAGNOSIS — Z78.0 POSTMENOPAUSAL: ICD-10-CM

## 2025-08-28 PROCEDURE — 77080 DXA BONE DENSITY AXIAL: CPT
